# Patient Record
Sex: FEMALE | Race: WHITE | NOT HISPANIC OR LATINO | Employment: OTHER | URBAN - NONMETROPOLITAN AREA
[De-identification: names, ages, dates, MRNs, and addresses within clinical notes are randomized per-mention and may not be internally consistent; named-entity substitution may affect disease eponyms.]

---

## 2021-07-05 ENCOUNTER — APPOINTMENT (EMERGENCY)
Dept: CT IMAGING | Facility: HOSPITAL | Age: 71
End: 2021-07-05
Payer: MEDICARE

## 2021-07-05 ENCOUNTER — HOSPITAL ENCOUNTER (EMERGENCY)
Facility: HOSPITAL | Age: 71
Discharge: HOME | End: 2021-07-05
Attending: EMERGENCY MEDICINE | Admitting: EMERGENCY MEDICINE
Payer: MEDICARE

## 2021-07-05 VITALS
DIASTOLIC BLOOD PRESSURE: 68 MMHG | TEMPERATURE: 97.6 F | HEIGHT: 60 IN | OXYGEN SATURATION: 99 % | RESPIRATION RATE: 17 BRPM | SYSTOLIC BLOOD PRESSURE: 166 MMHG | WEIGHT: 167.55 LBS | BODY MASS INDEX: 32.89 KG/M2 | HEART RATE: 60 BPM

## 2021-07-05 DIAGNOSIS — N39.0 UTI (URINARY TRACT INFECTION): Primary | ICD-10-CM

## 2021-07-05 LAB
ALBUMIN SERPL BCP-MCNC: 3.6 G/DL (ref 3.5–5)
ALP SERPL-CCNC: 93 U/L (ref 46–116)
ALT SERPL W P-5'-P-CCNC: 20 U/L (ref 12–78)
ANION GAP SERPL CALCULATED.3IONS-SCNC: 7 MMOL/L (ref 4–13)
AST SERPL W P-5'-P-CCNC: 19 U/L (ref 5–45)
BACTERIA UR QL AUTO: ABNORMAL /HPF
BASOPHILS # BLD AUTO: 0.06 THOUSANDS/ΜL (ref 0–0.1)
BASOPHILS NFR BLD AUTO: 1 % (ref 0–1)
BILIRUB SERPL-MCNC: 0.35 MG/DL (ref 0.2–1)
BILIRUB UR QL STRIP: ABNORMAL
BUN SERPL-MCNC: 19 MG/DL (ref 5–25)
CALCIUM SERPL-MCNC: 9.5 MG/DL (ref 8.3–10.1)
CHLORIDE SERPL-SCNC: 104 MMOL/L (ref 100–108)
CLARITY UR: CLEAR
CO2 SERPL-SCNC: 30 MMOL/L (ref 21–32)
COLOR UR: YELLOW
CREAT SERPL-MCNC: 1.19 MG/DL (ref 0.6–1.3)
EOSINOPHIL # BLD AUTO: 0.46 THOUSAND/ΜL (ref 0–0.61)
EOSINOPHIL NFR BLD AUTO: 4 % (ref 0–6)
ERYTHROCYTE [DISTWIDTH] IN BLOOD BY AUTOMATED COUNT: 14 % (ref 11.6–15.1)
GFR SERPL CREATININE-BSD FRML MDRD: 46 ML/MIN/1.73SQ M
GLUCOSE SERPL-MCNC: 142 MG/DL (ref 65–140)
GLUCOSE UR STRIP-MCNC: NEGATIVE MG/DL
HCT VFR BLD AUTO: 42.4 % (ref 34.8–46.1)
HGB BLD-MCNC: 14 G/DL (ref 11.5–15.4)
HGB UR QL STRIP.AUTO: NEGATIVE
IMM GRANULOCYTES # BLD AUTO: 0.04 THOUSAND/UL (ref 0–0.2)
IMM GRANULOCYTES NFR BLD AUTO: 0 % (ref 0–2)
KETONES UR STRIP-MCNC: NEGATIVE MG/DL
LACTATE SERPL-SCNC: 1.4 MMOL/L (ref 0.5–2)
LEUKOCYTE ESTERASE UR QL STRIP: ABNORMAL
LIPASE SERPL-CCNC: 145 U/L (ref 73–393)
LYMPHOCYTES # BLD AUTO: 4.02 THOUSANDS/ΜL (ref 0.6–4.47)
LYMPHOCYTES NFR BLD AUTO: 36 % (ref 14–44)
MCH RBC QN AUTO: 33.3 PG (ref 26.8–34.3)
MCHC RBC AUTO-ENTMCNC: 33 G/DL (ref 31.4–37.4)
MCV RBC AUTO: 101 FL (ref 82–98)
MONOCYTES # BLD AUTO: 0.86 THOUSAND/ΜL (ref 0.17–1.22)
MONOCYTES NFR BLD AUTO: 8 % (ref 4–12)
NEUTROPHILS # BLD AUTO: 5.62 THOUSANDS/ΜL (ref 1.85–7.62)
NEUTS SEG NFR BLD AUTO: 51 % (ref 43–75)
NITRITE UR QL STRIP: NEGATIVE
NON-SQ EPI CELLS URNS QL MICRO: ABNORMAL /HPF
NRBC BLD AUTO-RTO: 0 /100 WBCS
PH UR STRIP.AUTO: 7 [PH]
PLATELET # BLD AUTO: 259 THOUSANDS/UL (ref 149–390)
PMV BLD AUTO: 11.7 FL (ref 8.9–12.7)
POTASSIUM SERPL-SCNC: 4.7 MMOL/L (ref 3.5–5.3)
PROT SERPL-MCNC: 8.4 G/DL (ref 6.4–8.2)
PROT UR STRIP-MCNC: ABNORMAL MG/DL
RBC # BLD AUTO: 4.2 MILLION/UL (ref 3.81–5.12)
RBC #/AREA URNS AUTO: ABNORMAL /HPF
SODIUM SERPL-SCNC: 141 MMOL/L (ref 136–145)
SP GR UR STRIP.AUTO: 1.02 (ref 1–1.03)
UROBILINOGEN UR QL STRIP.AUTO: 0.2 E.U./DL
WBC # BLD AUTO: 11.06 THOUSAND/UL (ref 4.31–10.16)
WBC #/AREA URNS AUTO: ABNORMAL /HPF

## 2021-07-05 PROCEDURE — 83605 ASSAY OF LACTIC ACID: CPT | Performed by: EMERGENCY MEDICINE

## 2021-07-05 PROCEDURE — 85025 COMPLETE CBC W/AUTO DIFF WBC: CPT | Performed by: EMERGENCY MEDICINE

## 2021-07-05 PROCEDURE — 83690 ASSAY OF LIPASE: CPT | Performed by: EMERGENCY MEDICINE

## 2021-07-05 PROCEDURE — 99284 EMERGENCY DEPT VISIT MOD MDM: CPT | Performed by: EMERGENCY MEDICINE

## 2021-07-05 PROCEDURE — 87086 URINE CULTURE/COLONY COUNT: CPT | Performed by: EMERGENCY MEDICINE

## 2021-07-05 PROCEDURE — 36415 COLL VENOUS BLD VENIPUNCTURE: CPT | Performed by: EMERGENCY MEDICINE

## 2021-07-05 PROCEDURE — 74176 CT ABD & PELVIS W/O CONTRAST: CPT

## 2021-07-05 PROCEDURE — 80053 COMPREHEN METABOLIC PANEL: CPT | Performed by: EMERGENCY MEDICINE

## 2021-07-05 PROCEDURE — 81001 URINALYSIS AUTO W/SCOPE: CPT | Performed by: EMERGENCY MEDICINE

## 2021-07-05 PROCEDURE — 96360 HYDRATION IV INFUSION INIT: CPT

## 2021-07-05 PROCEDURE — 99284 EMERGENCY DEPT VISIT MOD MDM: CPT

## 2021-07-05 RX ORDER — ATORVASTATIN CALCIUM 80 MG/1
80 TABLET, FILM COATED ORAL
COMMUNITY
Start: 2021-04-08

## 2021-07-05 RX ORDER — NITROFURANTOIN 25; 75 MG/1; MG/1
100 CAPSULE ORAL 2 TIMES DAILY WITH MEALS
Status: DISCONTINUED | OUTPATIENT
Start: 2021-07-05 | End: 2021-07-05 | Stop reason: HOSPADM

## 2021-07-05 RX ORDER — ESCITALOPRAM OXALATE 20 MG/1
20 TABLET ORAL DAILY
COMMUNITY
Start: 2021-03-15

## 2021-07-05 RX ORDER — FINASTERIDE 5 MG/1
5 TABLET, FILM COATED ORAL DAILY
COMMUNITY
Start: 2021-04-19

## 2021-07-05 RX ORDER — LEVOTHYROXINE SODIUM 0.12 MG/1
125 TABLET ORAL DAILY
COMMUNITY
Start: 2021-06-16 | End: 2021-12-13

## 2021-07-05 RX ORDER — INSULIN ASPART 100 [IU]/ML
INJECTION, SOLUTION INTRAVENOUS; SUBCUTANEOUS 3 TIMES DAILY
COMMUNITY
Start: 2021-03-26

## 2021-07-05 RX ORDER — CLOPIDOGREL BISULFATE 75 MG/1
75 TABLET ORAL DAILY
COMMUNITY
Start: 2021-06-16

## 2021-07-05 RX ORDER — GABAPENTIN 100 MG/1
100 CAPSULE ORAL 3 TIMES DAILY
COMMUNITY
Start: 2021-01-08

## 2021-07-05 RX ORDER — CHLORAL HYDRATE 500 MG
2 CAPSULE ORAL 2 TIMES DAILY
COMMUNITY
Start: 2020-10-19 | End: 2021-10-19

## 2021-07-05 RX ORDER — BACLOFEN 20 MG
1000 TABLET ORAL 3 TIMES DAILY
COMMUNITY

## 2021-07-05 RX ORDER — LISINOPRIL 20 MG/1
20 TABLET ORAL DAILY
COMMUNITY
Start: 2021-06-16

## 2021-07-05 RX ORDER — ALBUTEROL SULFATE 90 UG/1
2 AEROSOL, METERED RESPIRATORY (INHALATION) AS NEEDED
COMMUNITY
Start: 2021-04-29 | End: 2022-04-29

## 2021-07-05 RX ORDER — ASPIRIN 81 MG/1
81 TABLET ORAL DAILY
COMMUNITY

## 2021-07-05 RX ORDER — NITROFURANTOIN 25; 75 MG/1; MG/1
100 CAPSULE ORAL 2 TIMES DAILY
Qty: 14 CAPSULE | Refills: 0 | Status: SHIPPED | OUTPATIENT
Start: 2021-07-05 | End: 2021-07-12

## 2021-07-05 RX ADMIN — SODIUM CHLORIDE 1000 ML: 0.9 INJECTION, SOLUTION INTRAVENOUS at 14:57

## 2021-07-05 RX ADMIN — NITROFURANTOIN (MONOHYDRATE/MACROCRYSTALS) 100 MG: 75; 25 CAPSULE ORAL at 16:11

## 2021-07-05 NOTE — ED PROVIDER NOTES
History  Chief Complaint   Patient presents with    Flank Pain     pt c/o bilateral flank pain radiates to abdomen with difficulty urinating for past 2-3 days  Patient complains of bilateral flank pain that radiates into the abdomen  The left appears worse than the right  No fevers or chills  No nausea vomiting diarrhea  States that she is only urinating small amounts  States there is blood when she wipes  No dysuria  No fevers or chills  No recent cough or cold symptoms  Is on Plavix secondary to an aortic stents  No trauma  History provided by:  Patient   used: No    Flank Pain  Pain location:  L flank and R flank  Pain quality: aching    Pain radiation: Radiates to lower abdomen  Pain severity:  Mild  Onset quality:  Gradual  Duration:  2 days  Timing:  Constant  Progression:  Waxing and waning  Chronicity:  New  Context: not alcohol use, not eating, not recent illness, not sick contacts and not suspicious food intake    Relieved by:  Nothing  Worsened by:  Nothing  Ineffective treatments:  None tried  Associated symptoms: hematuria    Associated symptoms: no anorexia, no chest pain, no chills, no constipation, no cough, no diarrhea, no dysuria, no fever, no nausea, no shortness of breath, no sore throat and no vomiting    Risk factors: being elderly        Prior to Admission Medications   Prescriptions Last Dose Informant Patient Reported? Taking?    Cholecalciferol 50 MCG (2000 UT) CAPS   Yes Yes   Sig: Take 2,000 Units by mouth daily   Magnesium Oxide 500 MG TABS   Yes Yes   Sig: Take 1,000 mg by mouth 3 (three) times a day   Omega-3 Fatty Acids (fish oil) 1,000 mg   Yes Yes   Sig: Take 2 g by mouth 2 (two) times a day   albuterol (PROVENTIL HFA,VENTOLIN HFA) 90 mcg/act inhaler   Yes Yes   Sig: Inhale 2 puffs as needed   aspirin (ECOTRIN LOW STRENGTH) 81 mg EC tablet   Yes Yes   Sig: Take 81 mg by mouth daily   atorvastatin (LIPITOR) 80 mg tablet   Yes Yes   Sig: Take 80 mg by mouth daily at bedtime   clopidogrel (PLAVIX) 75 mg tablet   Yes Yes   Sig: Take 75 mg by mouth daily   escitalopram (LEXAPRO) 20 mg tablet   Yes Yes   Sig: Take 20 mg by mouth daily   finasteride (PROSCAR) 5 mg tablet   Yes Yes   Sig: Take 5 mg by mouth daily   gabapentin (NEURONTIN) 100 mg capsule   Yes Yes   Sig: Take 100 mg by mouth 3 (three) times a day   insulin aspart (NovoLOG FlexPen) 100 UNIT/ML injection pen   Yes Yes   Sig: 3 (three) times a day   insulin glargine (LANTUS SOLOSTAR) 100 units/mL injection pen   Yes Yes   Sig: Inject 48 Units under the skin daily at bedtime   levothyroxine 125 mcg tablet   Yes Yes   Sig: Take 125 mcg by mouth daily   lisinopril (ZESTRIL) 20 mg tablet   Yes Yes   Sig: Take 20 mg by mouth daily   metoprolol tartrate (LOPRESSOR) 25 mg tablet   Yes Yes   Sig: Take 25 mg by mouth 2 (two) times a day      Facility-Administered Medications: None       Past Medical History:   Diagnosis Date    [facility]est[facility] failure) ([facility])     Coronary artery disease     Diabetes mellitus ([facility] [address] )     Disease of thyroid gland        Past Surgical History:   Procedure Laterality Date    APPENDECTOMY      CARDIAC SURGERY      CORONARY ANGIOPLASTY WITH STENT PLACEMENT      HERNIA REPAIR         History reviewed  No pertinent family history  I have reviewed and agree with the history as documented  E-Cigarette/Vaping    E-Cigarette Use Never User      E-Cigarette/Vaping Substances     Social History     Tobacco Use    Smoking status: Current Every Day Smoker     Packs/day: 0 25     Types: Cigarettes    Smokeless tobacco: Never Used   Vaping Use    Vaping Use: Never used   Substance Use Topics    Alcohol use: Not Currently    Drug use: Yes     Types: Marijuana       Review of Systems   Constitutional: Negative for chills and fever  HENT: Negative for ear pain, hearing loss, sore throat, trouble swallowing and voice change  Eyes: Negative for pain and discharge  Respiratory: Negative for cough, shortness of breath and wheezing  Cardiovascular: Negative for chest pain and palpitations  Gastrointestinal: Negative for abdominal pain, anorexia, blood in stool, constipation, diarrhea, nausea and vomiting  Genitourinary: Positive for flank pain and hematuria  Negative for dysuria and frequency  Musculoskeletal: Negative for joint swelling, neck pain and neck stiffness  Skin: Negative for rash and wound  Neurological: Negative for dizziness, seizures, syncope, facial asymmetry and headaches  Psychiatric/Behavioral: Negative for hallucinations, self-injury and suicidal ideas  All other systems reviewed and are negative  Physical Exam  Physical Exam  Vitals and nursing note reviewed  Constitutional:       General: She is not in acute distress  Appearance: She is well-developed  HENT:      Head: Normocephalic and atraumatic  Right Ear: External ear normal       Left Ear: External ear normal    Eyes:      General: No scleral icterus  Right eye: No discharge  Left eye: No discharge  Extraocular Movements: Extraocular movements intact  Conjunctiva/sclera: Conjunctivae normal    Cardiovascular:      Rate and Rhythm: Normal rate and regular rhythm  Heart sounds: Normal heart sounds  No murmur heard  Pulmonary:      Effort: Pulmonary effort is normal       Breath sounds: Normal breath sounds  No wheezing or rales  Abdominal:      General: Bowel sounds are normal  There is no distension  Palpations: Abdomen is soft  Tenderness: There is no abdominal tenderness  There is no guarding or rebound  Musculoskeletal:         General: No deformity  Normal range of motion  Cervical back: Normal range of motion and neck supple  Skin:     General: Skin is warm and dry  Findings: No rash  Neurological:      General: No focal deficit present        Mental Status: She is alert and oriented to person, place, and time  Cranial Nerves: No cranial nerve deficit  Psychiatric:         Mood and Affect: Mood normal          Behavior: Behavior normal          Thought Content: Thought content normal          Judgment: Judgment normal          Vital Signs  ED Triage Vitals [07/05/21 1434]   Temperature Pulse Respirations Blood Pressure SpO2   97 6 °F (36 4 °C) 67 17 (!) 177/75 96 %      Temp Source Heart Rate Source Patient Position - Orthostatic VS BP Location FiO2 (%)   Temporal Monitor Sitting Left arm --      Pain Score       7           Vitals:    07/05/21 1500 07/05/21 1530 07/05/21 1600 07/05/21 1612   BP: 135/63 148/62 166/68 166/68   Pulse: 58 55 60 60   Patient Position - Orthostatic VS: Lying Lying Lying Sitting         Visual Acuity      ED Medications  Medications   nitrofurantoin (MACROBID) extended-release capsule 100 mg (100 mg Oral Given 7/5/21 1611)   sodium chloride 0 9 % bolus 1,000 mL (0 mL Intravenous Stopped 7/5/21 1613)       Diagnostic Studies  Results Reviewed     Procedure Component Value Units Date/Time    Urine Microscopic [[id number]]  (Abnormal) Collected: 07/05/21 1555    Lab Status: Final result Specimen: Urine, Clean Catch Updated: 07/05/21 1619     RBC, UA None Seen /hpf      WBC, UA 10-20 /hpf      Epithelial Cells Occasional /hpf      Bacteria, UA Occasional /hpf     Urine culture [[id number]] Collected: 07/05/21 1555    Lab Status:  In process Specimen: Urine, Clean Catch Updated: 07/05/21 1619    UA w Reflex to Microscopic w Reflex to Culture [[id number]]  (Abnormal) Collected: 07/05/21 1555    Lab Status: Final result Specimen: Urine, Clean Catch Updated: 07/05/21 1604     Color, UA Yellow     Clarity, UA Clear     Specific Gravity, UA 1 025     pH, UA 7 0     Leukocytes, UA Trace     Nitrite, UA Negative     Protein,  (2+) mg/dl      Glucose, UA Negative mg/dl      Ketones, UA Negative mg/dl      Urobilinogen, UA 0 2 E U /dl      Bilirubin, UA Small     Blood, UA Negative Lactic acid [[id number]]  (Normal) Collected: 07/05/21 1455    Lab Status: Final result Specimen: Blood from Line, Venous Updated: 07/05/21 1523     LACTIC ACID 1 4 mmol/L     Narrative:      Result may be elevated if tourniquet was used during collection      Comprehensive metabolic panel [[id number]]  (Abnormal) Collected: 07/05/21 1455    Lab Status: Final result Specimen: Blood from Line, Venous Updated: 07/05/21 1515     Sodium 141 mmol/L      Potassium 4 7 mmol/L      Chloride 104 mmol/L      CO2 30 mmol/L      ANION GAP 7 mmol/L      BUN 19 mg/dL      Creatinine 1 19 mg/dL      Glucose 142 mg/dL      Calcium 9 5 mg/dL      AST 19 U/L      ALT 20 U/L      Alkaline Phosphatase 93 U/L      Total Protein 8 4 g/dL      Albumin 3 6 g/dL      Total Bilirubin 0 35 mg/dL      eGFR 46 ml/min/1 73sq m     Narrative:      [place] guidelines for Chronic Kidney Disease (CKD):     Stage 1 with normal or high GFR (GFR > 90 mL/min/1 73 square meters)    Stage 2 Mild CKD (GFR = 60-89 mL/min/1 73 square meters)    Stage 3A Moderate CKD (GFR = 45-59 mL/min/1 73 square meters)    Stage 3B Moderate CKD (GFR = 30-44 mL/min/1 73 square meters)    Stage 4 Severe CKD (GFR = 15-29 mL/min/1 73 square meters)    Stage 5 End Stage CKD (GFR <15 mL/min/1 73 square meters)  Note: GFR calculation is accurate only with a steady state creatinine    Lipase [[id number]]  (Normal) Collected: 07/05/21 1455    Lab Status: Final result Specimen: Blood from Line, Venous Updated: 07/05/21 1515     Lipase 145 u/L     CBC and differential [[id number]]  (Abnormal) Collected: 07/05/21 1455    Lab Status: Final result Specimen: Blood from Line, Venous Updated: 07/05/21 1502     WBC 11 06 Thousand/uL      RBC 4 20 Million/uL      Hemoglobin 14 0 g/dL      Hematocrit 42 4 %       fL      MCH 33 3 pg      MCHC 33 0 g/dL      RDW 14 0 %      MPV 11 7 fL      Platelets 655 Thousands/uL      nRBC 0 /100 WBCs      Neutrophils Relative 51 %      Immat GRANS % 0 %      Lymphocytes Relative 36 %      Monocytes Relative 8 %      Eosinophils Relative 4 %      Basophils Relative 1 %      Neutrophils Absolute 5 62 Thousands/µL      Immature Grans Absolute 0 04 Thousand/uL      Lymphocytes Absolute 4 02 Thousands/µL      Monocytes Absolute 0 86 Thousand/µL      Eosinophils Absolute 0 46 Thousand/µL      Basophils Absolute 0 06 Thousands/µL                  CT abdomen pelvis wo contrast   Final Result by Cr Stratton MD (07/05 1601)      No evidence of stone or hydronephrosis in the kidneys  No definite ureteral calculus  Of note there are a few small phleboliths near the course of the distal ureters without obvious ureteral stone  Left colon diverticulosis without CT evidence of diverticulitis  Workstation performed: [id number]                    Procedures  Procedures         ED Course  ED Course as of Jul 05 1624   Mon Jul 05, 2021   1600 Bladder scan was only 21 cc of urine  SBIRT 20yo+      Most Recent Value   SBIRT (22 yo +)   In order to provide better care to our patients, we are screening all of our patients for alcohol and drug use  Would it be okay to ask you these screening questions? Yes Filed at: 07/05/2021 1445   Initial Alcohol Screen: US AUDIT-C    1  How often do you have a drink containing alcohol?  0 Filed at: 07/05/2021 1445   2  How many drinks containing alcohol do you have on a typical day you are drinking? 0 Filed at: 07/05/2021 1445   3b  FEMALE Any Age, or MALE 65+: How often do you have 4 or more drinks on one occassion? 0 Filed at: 07/05/2021 1445   Audit-C Score  0 Filed at: 07/05/2021 1445   CL[place]: How many times in the past year have you    Used an illegal drug or used a prescription medication for non-medical reasons?   Never Filed at: 07/05/2021 1445                    MDM  Number of Diagnoses or Management Options  Diagnosis management comments: Patient has normal-appearing kidneys with no hydronephrosis or hydroureteronephrosis on CT scan  Creatinine is normal per blood work  Most likely UTI  Will treat with antibiotics  Amount and/or Complexity of Data Reviewed  Clinical lab tests: reviewed  Review and summarize past medical records: yes        Disposition  Final diagnoses:   UTI (urinary tract infection)     Time reflects when diagnosis was documented in both MDM as applicable and the Disposition within this note     Time User Action Codes Description Comment    7/5/2021  4:07 PM Carine Gonzalez Add [N39 0] UTI (urinary tract infection)       ED Disposition     ED Disposition Condition Date/Time Comment    Discharge Stable Mon Jul 5, 2021  4:07 PM Apple Espinoza discharge to home/self care  Follow-up Information     Follow up With Specialties Details Why Contact Info    Prabhjot Botello DO Family Medicine Call in 2 days  [address] [facility]  [phone]            Patient's Medications   Discharge Prescriptions    NITROFURANTOIN (MACROBID) 100 MG CAPSULE    Take 1 capsule (100 mg total) by mouth 2 (two) times a day for 7 days       Start Date: 7/5/2021  End Date: 7/12/2021       Order Dose: 100 mg       Quantity: 14 capsule    Refills: 0     No discharge procedures on file      PDMP Review     None          ED Provider  Electronically Signed by           Calvin Izquierdo MD  07/05/21 4972

## 2021-07-07 LAB — BACTERIA UR CULT: NORMAL

## 2023-12-31 ENCOUNTER — APPOINTMENT (OUTPATIENT)
Dept: RADIOLOGY | Facility: CLINIC | Age: 73
End: 2023-12-31
Payer: MEDICARE

## 2023-12-31 ENCOUNTER — OFFICE VISIT (OUTPATIENT)
Dept: URGENT CARE | Facility: CLINIC | Age: 73
End: 2023-12-31
Payer: MEDICARE

## 2023-12-31 VITALS
DIASTOLIC BLOOD PRESSURE: 102 MMHG | SYSTOLIC BLOOD PRESSURE: 136 MMHG | RESPIRATION RATE: 16 BRPM | HEIGHT: 61 IN | HEART RATE: 74 BPM | BODY MASS INDEX: 30.21 KG/M2 | WEIGHT: 160 LBS | OXYGEN SATURATION: 95 % | TEMPERATURE: 96.1 F

## 2023-12-31 DIAGNOSIS — M25.552 PAIN OF LEFT HIP: ICD-10-CM

## 2023-12-31 DIAGNOSIS — M25.552 PAIN OF LEFT HIP: Primary | ICD-10-CM

## 2023-12-31 PROCEDURE — G0463 HOSPITAL OUTPT CLINIC VISIT: HCPCS

## 2023-12-31 PROCEDURE — 73502 X-RAY EXAM HIP UNI 2-3 VIEWS: CPT

## 2023-12-31 PROCEDURE — 99213 OFFICE O/P EST LOW 20 MIN: CPT

## 2023-12-31 RX ORDER — FENOFIBRATE 48 MG/1
TABLET, COATED ORAL
COMMUNITY

## 2023-12-31 RX ORDER — TRAMADOL HYDROCHLORIDE 50 MG/1
50 TABLET ORAL EVERY 6 HOURS PRN
Qty: 20 TABLET | Refills: 0 | Status: SHIPPED | OUTPATIENT
Start: 2023-12-31

## 2023-12-31 RX ORDER — OMEPRAZOLE 20 MG/1
20 TABLET, DELAYED RELEASE ORAL DAILY
COMMUNITY

## 2023-12-31 RX ORDER — MAGNESIUM 30 MG
71.5 TABLET ORAL 2 TIMES DAILY
COMMUNITY

## 2023-12-31 NOTE — PROGRESS NOTES
[facility]        NAME: Mirna Medina is a 73 y.o. female  : 1950    MRN: [id number]  DATE: 2023  TIME: 2:16 PM    Assessment and Plan   Pain of left hip [M25.552]  1. Pain of left hip  XR hip/pelv 2-3 vws left if performed    traMADol (Ultram) 50 mg tablet    Ambulatory Referral to Orthopedic Surgery        X-ray interpreted by myself. Arthritis changes. Pending radiology review     Patient Instructions     Take the tramadol as needed for pain  Do not drive or drink alcohol while taking .  You can use an over the counter lidoderm patch in addition to the tramadol if needed  Follow-up with the orthopedic  Follow up with PCP in 3-5 days.  Proceed to  ER if symptoms worsen.    Chief Complaint     Chief Complaint   Patient presents with    Hip Pain     X7 days - mechanical fall onto L hip. Fell from standing onto carpeted floor. Denies head strike and LOC. Unable to sleep due to pain. Takes plavix. OTC - tylenol, topical creams         History of Present Illness       Patient is a 73YOF presenting with left hip pain after a fall a week ago. She states she lost her balance and fell onto her carpet. She denies any head injury. She is taking plavix. She has been using heat and taking tylenol with out any relief. She states she does have  arthritis in her joints.     Hip Pain         Review of Systems   Review of Systems   Musculoskeletal:  Positive for arthralgias. Negative for back pain.   Neurological:  Negative for syncope, weakness and headaches.   All other systems reviewed and are negative.        Current Medications       Current Outpatient Medications:     aspirin (ECOTRIN LOW STRENGTH) 81 mg EC tablet, Take 81 mg by mouth daily, Disp: , Rfl:     atorvastatin (LIPITOR) 80 mg tablet, Take 80 mg by mouth daily at bedtime, Disp: , Rfl:     Cholecalciferol 50 MCG (2000) CAPS, Take 2,000 Units by mouth daily, Disp: , Rfl:     clopidogrel (PLAVIX) 75 mg tablet, Take 75 mg by mouth  daily, Disp: , Rfl:     Empagliflozin (JARDIANCE) 10 MG TABS tablet, Take 10 mg by mouth daily, Disp: , Rfl:     finasteride (PROSCAR) 5 mg tablet, Take 5 mg by mouth daily, Disp: , Rfl:     gabapentin (NEURONTIN) 100 mg capsule, Take 100 mg by mouth 3 (three) times a day, Disp: , Rfl:     insulin aspart (NovoLOG FlexPen) 100 UNIT/ML injection pen, 3 (three) times a day, Disp: , Rfl:     insulin glargine (LANTUS SOLOSTAR) 100 units/mL injection pen, Inject 48 Units under the skin daily at bedtime, Disp: , Rfl:     levothyroxine 125 mcg tablet, Take 150 mcg by mouth daily, Disp: , Rfl:     magnesium 30 MG tablet, Take 71.5 mg by mouth 2 (two) times a day 71.5mg OTC - magnesium chloride (slow Mag), Disp: , Rfl:     metoprolol tartrate (LOPRESSOR) 25 mg tablet, Take 25 mg by mouth 2 (two) times a day, Disp: , Rfl:     omeprazole (PriLOSEC OTC) 20 MG tablet, Take 20 mg by mouth daily, Disp: , Rfl:     traMADol (Ultram) 50 mg tablet, Take 1 tablet (50 mg total) by mouth every 6 (six) hours as needed for moderate pain, Disp: 20 tablet, Rfl: 0    escitalopram (LEXAPRO) 20 mg tablet, Take 20 mg by mouth daily, Disp: , Rfl:     fenofibrate (TRICOR) 48 mg tablet, , Disp: , Rfl:     lisinopril (ZESTRIL) 20 mg tablet, Take 20 mg by mouth daily, Disp: , Rfl:     Magnesium Oxide 500 MG TABS, Take 1,000 mg by mouth 3 (three) times a day, Disp: , Rfl:     Omega-3 Fatty Acids (fish oil) 1,000 mg, Take 2 g by mouth 2 (two) times a day, Disp: , Rfl:     Current Allergies     Allergies as of 12/31/2023 - Reviewed 12/31/2023   Allergen Reaction Noted    Bupropion GI Intolerance 01/20/2020    Varenicline Other (See Comments) 05/04/2023    Ibuprofen Other (See Comments) 09/12/2019    Liraglutide GI Intolerance 10/19/2020    Medical tape Other (See Comments) 04/03/2013    Wound dressing adhesive Itching 07/05/2021    Keflex [cephalexin] Rash 07/05/2021    Latex Rash 03/04/2015            The following portions of the patient's history were  "reviewed and updated as appropriate: allergies, current medications, past family history, past medical history, past social history, past surgical history and problem list.     Past Medical History:   Diagnosis Date    CHF (congestive heart failure) (HCC)     Coronary artery disease     Diabetes mellitus (HCC)     Disease of thyroid gland        Past Surgical History:   Procedure Laterality Date    APPENDECTOMY      CARDIAC SURGERY      CORONARY ANGIOPLASTY WITH STENT PLACEMENT      HERNIA REPAIR         History reviewed. No pertinent family history.      Medications have been verified.        Objective   BP (!) 136/102 (BP Location: Right arm, Patient Position: Sitting, Cuff Size: Standard)   Pulse 74   Temp (!) 96.1 °F (35.6 °C) (Temporal)   Resp 16   Ht 5' 0.5\" (1.537 m)   Wt 72.6 kg (160 lb)   SpO2 95%   BMI 30.73 kg/m²        Physical Exam     Physical Exam  Vitals and nursing note reviewed.   Constitutional:       General: She is not in acute distress.     Appearance: Normal appearance. She is normal weight. She is not ill-appearing or toxic-appearing.   Musculoskeletal:      Left hip: Tenderness present. No deformity. Decreased range of motion. Normal strength.        Legs:    Neurological:      Mental Status: She is alert.                   "

## 2023-12-31 NOTE — PATIENT INSTRUCTIONS
Take the tramadol as needed for pain  Do not drive or drink alcohol while taking .  You can use an over the counter lidoderm patch in addition to the tramadol if needed  Follow-up with the orthopedic

## 2024-01-01 ENCOUNTER — HOSPITAL ENCOUNTER (EMERGENCY)
Facility: HOSPITAL | Age: 74
Discharge: HOME | End: 2024-01-01
Attending: EMERGENCY MEDICINE
Payer: MEDICARE

## 2024-01-01 VITALS
DIASTOLIC BLOOD PRESSURE: 94 MMHG | TEMPERATURE: 97.5 F | SYSTOLIC BLOOD PRESSURE: 141 MMHG | HEART RATE: 76 BPM | RESPIRATION RATE: 18 BRPM | OXYGEN SATURATION: 94 %

## 2024-01-01 DIAGNOSIS — M25.552 LEFT HIP PAIN: Primary | ICD-10-CM

## 2024-01-01 PROCEDURE — 99284 EMERGENCY DEPT VISIT MOD MDM: CPT | Performed by: EMERGENCY MEDICINE

## 2024-01-01 PROCEDURE — 96372 THER/PROPH/DIAG INJ SC/IM: CPT

## 2024-01-01 PROCEDURE — 99283 EMERGENCY DEPT VISIT LOW MDM: CPT

## 2024-01-01 RX ORDER — OXYCODONE HYDROCHLORIDE AND ACETAMINOPHEN 5; 325 MG/1; MG/1
1 TABLET ORAL ONCE
Status: COMPLETED | OUTPATIENT
Start: 2024-01-01 | End: 2024-01-01

## 2024-01-01 RX ORDER — KETOROLAC TROMETHAMINE 30 MG/ML
30 INJECTION, SOLUTION INTRAMUSCULAR; INTRAVENOUS ONCE
Status: COMPLETED | OUTPATIENT
Start: 2024-01-01 | End: 2024-01-01

## 2024-01-01 RX ORDER — LIDOCAINE 50 MG/G
1 PATCH TOPICAL DAILY
Qty: 15 PATCH | Refills: 0 | Status: SHIPPED | OUTPATIENT
Start: 2024-01-01 | End: 2024-01-16

## 2024-01-01 RX ORDER — OXYCODONE HYDROCHLORIDE AND ACETAMINOPHEN 5; 325 MG/1; MG/1
1 TABLET ORAL EVERY 4 HOURS PRN
Qty: 10 TABLET | Refills: 0 | Status: SHIPPED | OUTPATIENT
Start: 2024-01-01

## 2024-01-01 RX ORDER — LIDOCAINE 50 MG/G
1 PATCH TOPICAL ONCE
Status: DISCONTINUED | OUTPATIENT
Start: 2024-01-01 | End: 2024-01-01 | Stop reason: HOSPADM

## 2024-01-01 RX ADMIN — KETOROLAC TROMETHAMINE 30 MG: 30 INJECTION, SOLUTION INTRAMUSCULAR at 12:45

## 2024-01-01 RX ADMIN — OXYCODONE HYDROCHLORIDE AND ACETAMINOPHEN 1 TABLET: 5; 325 TABLET ORAL at 12:44

## 2024-01-01 RX ADMIN — LIDOCAINE 5% 1 PATCH: 700 PATCH TOPICAL at 12:44

## 2024-01-01 NOTE — ED PROVIDER NOTES
History  Chief Complaint   Patient presents with    Hip Pain     Patient c/o left hip pain that is chronic. Patient was told she needed to have sx but wasn't able to follow up. Patient seen at urgent care yesterday.      Patient is a 73-year-old female presenting to the emergency department complaining of left hip pain that is been bothering her for the past week, she did have a fall onto her knee at onset of symptoms, she was seen at urgent care yesterday and had x-rays performed, she was diagnosed with arthritis of the hip and prescribed tramadol, she reports this medication is not alleviating her symptoms at all, she was also referred for follow-up with orthopedics but obviously has not had time to do so since her visit yesterday and the holiday today, she denies any numbness or tingling, no bowel or bladder dysfunction, no fever or chills, no urinary symptoms        Prior to Admission Medications   Prescriptions Last Dose Informant Patient Reported? Taking?   Cholecalciferol 50 MCG (2000 UT) CAPS   Yes No   Sig: Take 2,000 Units by mouth daily   Empagliflozin (JARDIANCE) 10 MG TABS tablet   Yes No   Sig: Take 10 mg by mouth daily   Magnesium Oxide 500 MG TABS   Yes No   Sig: Take 1,000 mg by mouth 3 (three) times a day   Omega-3 Fatty Acids (fish oil) 1,000 mg   Yes No   Sig: Take 2 g by mouth 2 (two) times a day   aspirin (ECOTRIN LOW STRENGTH) 81 mg EC tablet   Yes No   Sig: Take 81 mg by mouth daily   atorvastatin (LIPITOR) 80 mg tablet   Yes No   Sig: Take 80 mg by mouth daily at bedtime   clopidogrel (PLAVIX) 75 mg tablet   Yes No   Sig: Take 75 mg by mouth daily   escitalopram (LEXAPRO) 20 mg tablet   Yes No   Sig: Take 20 mg by mouth daily   fenofibrate (TRICOR) 48 mg tablet   Yes No   finasteride (PROSCAR) 5 mg tablet   Yes No   Sig: Take 5 mg by mouth daily   gabapentin (NEURONTIN) 100 mg capsule   Yes No   Sig: Take 100 mg by mouth 3 (three) times a day   insulin aspart (NovoLOG FlexPen) 100 UNIT/ML  injection pen   Yes No   Sig: 3 (three) times a day   insulin glargine (LANTUS SOLOSTAR) 100 units/mL injection pen   Yes No   Sig: Inject 48 Units under the skin daily at bedtime   levothyroxine 125 mcg tablet  Self Yes No   Sig: Take 150 mcg by mouth daily   lisinopril (ZESTRIL) 20 mg tablet   Yes No   Sig: Take 20 mg by mouth daily   magnesium 30 MG tablet  Self Yes No   Sig: Take 71.5 mg by mouth 2 (two) times a day 71.5mg OTC - magnesium chloride (slow Mag)   metoprolol tartrate (LOPRESSOR) 25 mg tablet   Yes No   Sig: Take 25 mg by mouth 2 (two) times a day   omeprazole (PriLOSEC OTC) 20 MG tablet   Yes No   Sig: Take 20 mg by mouth daily   traMADol (Ultram) 50 mg tablet   No No   Sig: Take 1 tablet (50 mg total) by mouth every 6 (six) hours as needed for moderate pain      Facility-Administered Medications: None       Past Medical History:   Diagnosis Date    CHF (congestive heart failure) (HCC)     Coronary artery disease     Diabetes mellitus (HCC)     Disease of thyroid gland        Past Surgical History:   Procedure Laterality Date    APPENDECTOMY      CARDIAC SURGERY      CORONARY ANGIOPLASTY WITH STENT PLACEMENT      HERNIA REPAIR         History reviewed. No pertinent family history.  I have reviewed and agree with the history as documented.    E-Cigarette/Vaping    E-Cigarette Use Never User      E-Cigarette/Vaping Substances     Social History     Tobacco Use    Smoking status: Every Day     Current packs/day: 0.25     Types: Cigarettes    Smokeless tobacco: Never   Vaping Use    Vaping status: Never Used   Substance Use Topics    Alcohol use: Not Currently    Drug use: Yes     Types: Marijuana       Review of Systems   Constitutional: Negative.    HENT: Negative.     Eyes: Negative.    Respiratory: Negative.     Cardiovascular: Negative.    Gastrointestinal: Negative.    Endocrine: Negative.    Genitourinary: Negative.    Musculoskeletal:  Positive for arthralgias.   Skin: Negative.     Allergic/Immunologic: Negative.    Neurological: Negative.    Hematological: Negative.    Psychiatric/Behavioral: Negative.         Physical Exam  Physical Exam  Constitutional:       Appearance: She is well-developed.   HENT:      Head: Normocephalic and atraumatic.   Eyes:      Conjunctiva/sclera: Conjunctivae normal.      Pupils: Pupils are equal, round, and reactive to light.   Cardiovascular:      Rate and Rhythm: Normal rate.   Pulmonary:      Effort: Pulmonary effort is normal.   Abdominal:      Palpations: Abdomen is soft.   Musculoskeletal:         General: Normal range of motion.      Cervical back: Normal range of motion and neck supple.        Legs:       Comments: Tenderness to palpate over the left lateral hip, no ecchymosis, skin lesions, erythema, swelling, no bony deformity, distal sensation and motor is intact, gait steady   Skin:     General: Skin is warm and dry.   Neurological:      Mental Status: She is alert and oriented to person, place, and time.         Vital Signs  ED Triage Vitals   Temperature Pulse Respirations Blood Pressure SpO2   01/01/24 1155 01/01/24 1155 01/01/24 1155 01/01/24 1155 01/01/24 1155   97.5 °F (36.4 °C) 76 18 141/94 94 %      Temp Source Heart Rate Source Patient Position - Orthostatic VS BP Location FiO2 (%)   01/01/24 1155 01/01/24 1155 01/01/24 1155 01/01/24 1155 --   Temporal Monitor Sitting Left arm       Pain Score       01/01/24 1244       7           Vitals:    01/01/24 1155   BP: 141/94   Pulse: 76   Patient Position - Orthostatic VS: Sitting         Visual Acuity      ED Medications  Medications   lidocaine (LIDODERM) 5 % patch 1 patch (1 patch Topical Medication Applied 1/1/24 1244)   ketorolac (TORADOL) injection 30 mg (30 mg Intramuscular Given 1/1/24 1245)   oxyCODONE-acetaminophen (PERCOCET) 5-325 mg per tablet 1 tablet (1 tablet Oral Given 1/1/24 1244)       Diagnostic Studies  Results Reviewed       None                   No orders to display               Procedures  Procedures         ED Course                                             Medical Decision Making   Patient presents with left hip pain.  Given history, exam and work-up, patient likely has osteoarthritis versus muscle strain versus bursitis.  I have low suspicion for fracture, dislocation, significant ligamentous injury, septic arthritis, gout flare, new autoimmune arthropathy or gonococcal arthropathy.      Problems Addressed:  Left hip pain: acute illness or injury    Amount and/or Complexity of Data Reviewed  External Data Reviewed: labs, radiology and notes.    Risk  OTC drugs.  Prescription drug management.             Disposition  Final diagnoses:   Left hip pain     Time reflects when diagnosis was documented in both MDM as applicable and the Disposition within this note       Time User Action Codes Description Comment    1/1/2024 12:37 PM Malinda Maya [M25.552] Left hip pain           ED Disposition       ED Disposition   Discharge    Condition   Stable    Date/Time   Mon Jan 1, 2024 12:37 PM    Comment   Mirna Medina discharge to home/self care.                   Follow-up Information       Follow up With Specialties Details Why Contact Info    [facility] In 1 week  [address] [facility]  [address] [place] [id number]  [phone]              Patient's Medications   Discharge Prescriptions    LIDOCAINE (LIDODERM) 5 %    Apply 1 patch topically over 12 hours daily for 15 days Remove & Discard patch within 12 hours or as directed by MD       Start Date: 1/1/2024  End Date: 1/16/2024       Order Dose: 1 patch       Quantity: 15 patch    Refills: 0    OXYCODONE-ACETAMINOPHEN (PERCOCET) 5-325 MG PER TABLET    Take 1 tablet by mouth every 4 (four) hours as needed for moderate pain for up to 10 doses Max Daily Amount: 6 tablets       Start Date: 1/1/2024  End Date: --       Order Dose: 1 tablet       Quantity: 10 tablet    Refills: 0           PDMP Review        None            ED Provider  Electronically Signed by             Malinda Maya DO  01/01/24 1243

## 2024-01-03 ENCOUNTER — OFFICE VISIT (OUTPATIENT)
Dept: OBGYN CLINIC | Facility: CLINIC | Age: 74
End: 2024-01-03
Payer: MEDICARE

## 2024-01-03 ENCOUNTER — HOSPITAL ENCOUNTER (OUTPATIENT)
Dept: RADIOLOGY | Facility: CLINIC | Age: 74
Discharge: HOME | End: 2024-01-03
Payer: MEDICARE

## 2024-01-03 VITALS
HEIGHT: 60 IN | HEART RATE: 77 BPM | BODY MASS INDEX: 32.39 KG/M2 | TEMPERATURE: 97.8 F | WEIGHT: 165 LBS | DIASTOLIC BLOOD PRESSURE: 70 MMHG | SYSTOLIC BLOOD PRESSURE: 120 MMHG

## 2024-01-03 DIAGNOSIS — R29.818 NEUROGENIC CLAUDICATION: Primary | ICD-10-CM

## 2024-01-03 DIAGNOSIS — M70.62 GREATER TROCHANTERIC BURSITIS OF LEFT HIP: ICD-10-CM

## 2024-01-03 DIAGNOSIS — M25.552 LEFT HIP PAIN: ICD-10-CM

## 2024-01-03 DIAGNOSIS — R29.818 NEUROGENIC CLAUDICATION: ICD-10-CM

## 2024-01-03 PROCEDURE — 20610 DRAIN/INJ JOINT/BURSA W/O US: CPT | Performed by: ORTHOPAEDIC SURGERY

## 2024-01-03 PROCEDURE — 72100 X-RAY EXAM L-S SPINE 2/3 VWS: CPT

## 2024-01-03 PROCEDURE — 99214 OFFICE O/P EST MOD 30 MIN: CPT | Performed by: ORTHOPAEDIC SURGERY

## 2024-01-03 RX ADMIN — TRIAMCINOLONE ACETONIDE 80 MG: 40 INJECTION, SUSPENSION INTRA-ARTICULAR; INTRAMUSCULAR at 14:30

## 2024-01-03 RX ADMIN — BUPIVACAINE HYDROCHLORIDE 4 ML: 2.5 INJECTION, SOLUTION INFILTRATION; PERINEURAL at 14:30

## 2024-01-03 NOTE — PROGRESS NOTES
ASSESSMENT/PLAN:    Diagnoses and all orders for this visit:    Neurogenic claudication  -     XR spine lumbar 2 or 3 views injury; Future  -     Ambulatory Referral to Physical Therapy; Future  -     Ambulatory referral to Spine & Pain Management; Future    Greater trochanteric bursitis of left hip  -     Ambulatory Referral to Orthopedic Surgery  -     Ambulatory Referral to Physical Therapy; Future  -     Large joint arthrocentesis: L greater trochanteric bursa    Left hip pain  -     Ambulatory Referral to Orthopedic Surgery  -     Ambulatory Referral to Physical Therapy; Future        Reviewed physical exam and imaging with patient at time of visit. Discussed with patient that the radicular symptoms she experiences are consistent with neurogenic claudication. She also demonstrates symptoms consistent with greater trochanteric bursitis of her left hip. She was offered and accepted an injection(s) of Kenalog and Marcaine to her Left greater trochanter(s) for symptomatic relief of pain and inflammation. Patient tolerated the treatment(s) well. Ice and post injection protocol advised. Weightbearing activities as tolerated. A prescription was provided for physical therapy. Recommended that she follow-up with pain management for further evaluation. The patient expresses understanding and is in agreement with today's treatment plan.       Return if symptoms worsen or fail to improve.      _____________________________________________________  CHIEF COMPLAINT:  Chief Complaint   Patient presents with    Left Hip - Pain         SUBJECTIVE:  Mirna Medina is a 73 y.o. year old female who presents for initial evaluation of her left hip. The patient reports intermittent pain for approximately 10 years, however, she reports a recent onset of significant pain for approximately 1 week. She reported to her local urgent care on 12/31/23 where she was prescribed tramadol. She states that the pain continued to worsen, therefore  she reported to her local ED on 1/1/24 where she was provided a prescription for oxycodone. She states that she has only taken one to two doses of the oxycodone, which provided minimal relief. She reports significant pain along the lateral aspect of her hip, that travels down her thigh to her knee. She reports chronic pain that is present at rest and exacerbated with activity. She also reports pain in her right thigh. She reports pain in her low back. The patient reports weakness and fatigue with activity.        PAST MEDICAL HISTORY:  Past Medical History:   Diagnosis Date    CHF (congestive heart failure) (HCC)     Coronary artery disease     Diabetes mellitus (HCC)     Disease of thyroid gland        PAST SURGICAL HISTORY:  Past Surgical History:   Procedure Laterality Date    APPENDECTOMY      CARDIAC SURGERY      CORONARY ANGIOPLASTY WITH STENT PLACEMENT      HERNIA REPAIR         FAMILY HISTORY:  History reviewed. No pertinent family history.    SOCIAL HISTORY:  Social History     Tobacco Use    Smoking status: Every Day     Current packs/day: 0.25     Types: Cigarettes    Smokeless tobacco: Never   Vaping Use    Vaping status: Never Used   Substance Use Topics    Alcohol use: Not Currently    Drug use: Yes     Types: Marijuana       MEDICATIONS:    Current Outpatient Medications:     aspirin (ECOTRIN LOW STRENGTH) 81 mg EC tablet, Take 81 mg by mouth daily, Disp: , Rfl:     atorvastatin (LIPITOR) 80 mg tablet, Take 80 mg by mouth daily at bedtime, Disp: , Rfl:     Cholecalciferol 50 MCG (2000 UT) CAPS, Take 2,000 Units by mouth daily, Disp: , Rfl:     clopidogrel (PLAVIX) 75 mg tablet, Take 75 mg by mouth daily, Disp: , Rfl:     Empagliflozin (JARDIANCE) 10 MG TABS tablet, Take 10 mg by mouth daily, Disp: , Rfl:     escitalopram (LEXAPRO) 20 mg tablet, Take 20 mg by mouth daily, Disp: , Rfl:     fenofibrate (TRICOR) 48 mg tablet, , Disp: , Rfl:     finasteride (PROSCAR) 5 mg tablet, Take 5 mg by mouth daily,  Disp: , Rfl:     gabapentin (NEURONTIN) 100 mg capsule, Take 100 mg by mouth 3 (three) times a day, Disp: , Rfl:     insulin aspart (NovoLOG FlexPen) 100 UNIT/ML injection pen, 3 (three) times a day, Disp: , Rfl:     insulin glargine (LANTUS SOLOSTAR) 100 units/mL injection pen, Inject 48 Units under the skin daily at bedtime, Disp: , Rfl:     levothyroxine 125 mcg tablet, Take 150 mcg by mouth daily, Disp: , Rfl:     lidocaine (Lidoderm) 5 %, Apply 1 patch topically over 12 hours daily for 15 days Remove & Discard patch within 12 hours or as directed by MD, Disp: 15 patch, Rfl: 0    lisinopril (ZESTRIL) 20 mg tablet, Take 20 mg by mouth daily, Disp: , Rfl:     magnesium 30 MG tablet, Take 71.5 mg by mouth 2 (two) times a day 71.5mg OTC - magnesium chloride (slow Mag), Disp: , Rfl:     Magnesium Oxide 500 MG TABS, Take 1,000 mg by mouth 3 (three) times a day, Disp: , Rfl:     metoprolol tartrate (LOPRESSOR) 25 mg tablet, Take 25 mg by mouth 2 (two) times a day, Disp: , Rfl:     Omega-3 Fatty Acids (fish oil) 1,000 mg, Take 2 g by mouth 2 (two) times a day, Disp: , Rfl:     omeprazole (PriLOSEC OTC) 20 MG tablet, Take 20 mg by mouth daily, Disp: , Rfl:     oxyCODONE-acetaminophen (Percocet) 5-325 mg per tablet, Take 1 tablet by mouth every 4 (four) hours as needed for moderate pain for up to 10 doses Max Daily Amount: 6 tablets, Disp: 10 tablet, Rfl: 0    traMADol (Ultram) 50 mg tablet, Take 1 tablet (50 mg total) by mouth every 6 (six) hours as needed for moderate pain (Patient not taking: Reported on 1/3/2024), Disp: 20 tablet, Rfl: 0    ALLERGIES:  Allergies   Allergen Reactions    Bupropion GI Intolerance    Varenicline Other (See Comments)     No appetite, nauseated, no energy    Ibuprofen Other (See Comments)     Does not combine with medicine she takes    Liraglutide GI Intolerance    Medical Tape Other (See Comments)     itchy and red    Wound Dressing Adhesive Itching    Keflex [Cephalexin] Rash    Latex  Rash       Review of systems:   Constitutional: Negative for fatigue, fever or loss of apetite.   HENT: Negative.    Respiratory: Negative for shortness of breath, dyspnea.    Cardiovascular: Negative for chest pain/tightness.   Gastrointestinal: Negative for abdominal pain, N/V.   Endocrine: Negative for cold/heat intolerance, unexplained weight loss/gain.   Genitourinary: Negative for flank pain, dysuria, hematuria.   Musculoskeletal: As noted in HPI.    Skin: Negative for rash.    Neurological: Negative.  Psychiatric/Behavioral: Negative for agitation.  _____________________________________________________  PHYSICAL EXAMINATION:    Blood pressure 120/70, pulse 77, temperature 97.8 °F (36.6 °C), height 5' (1.524 m), weight 74.8 kg (165 lb).    General: well developed and well nourished, alert, oriented times 3, and appears comfortable  Psychiatric: Normal  HEENT: Benign  Cardiovascular: Regular    Pulmonary: No wheezing or stridor  Abdomen: Soft, Nontender  Skin: No masses, erythema, lacerations, fluctation, ulcerations  Neurovascular: Intact    MUSCULOSKELETAL EXAMINATION:    left Hip -  Patient presents with no anatomical deformity  Ambulates with antalgic gait pattern  Uses No assistive devices  TTP over greater trochanter / trochanteric bursa, PSIS, piriformis  ROM: 15 IR, 45 ER. 100 hip flexion  MMT: 5/5 throughout  Knee flexor and extensor mechanism intact  Ankle DF and PF mechanism intact  - VIVIAN, - FADIR  - Log roll  - Squeeze test   2+ TP and DP pulses with brisk capillary refill to the toes  Sural, saphenous, tibial, superficial and deep peroneal motor and sensory distribution intact  Sensation to light touch intact         _____________________________________________________  STUDIES REVIEWED:  The attending physician has personally reviewed the pertinent films in PACS and the interpretation is as follows:    X-Ray of left hip taken on 12/31/23 were reviewed and showed mild bilateral hip degenerative  changes.     X-Ray of lumbar spine taken on 1/3/24 were reviewed demonstrating straightening of the lumbar cyst, degenerative scoliosis and diffuse disc disease and spondylosis of the lumbar spine with grade of change noted at the L3-4 level.    The x-ray report of the hip image was reviewed.    The ER note from 1/1/2024 was reviewed.      PROCEDURES:  Large joint arthrocentesis: L greater trochanteric bursa  Universal Protocol:  Consent: Verbal consent obtained.  Risks and benefits: risks, benefits and alternatives were discussed  Consent given by: patient  Timeout called at: 1/3/2024 3:47 PM.  Patient understanding: patient states understanding of the procedure being performed  Site marked: the operative site was marked  Patient identity confirmed: verbally with patient  Supporting Documentation  Indications: pain and joint swelling   Procedure Details  Location: hip - L greater trochanteric bursa  Needle gauge: Spinal needle.  Ultrasound guidance: no  Approach: lateral  Medications administered: 4 mL bupivacaine 0.25 %; 80 mg triamcinolone acetonide 40 mg/mL    Patient tolerance: patient tolerated the procedure well with no immediate complications  Dressing:  Sterile dressing applied            Scribe Attestation      I,:  Joaquin Wong am acting as a scribe while in the presence of the attending physician.:       I,:  [facility] personally performed the services described in this documentation    as scribed in my presence.:

## 2024-01-05 RX ORDER — TRIAMCINOLONE ACETONIDE 40 MG/ML
80 INJECTION, SUSPENSION INTRA-ARTICULAR; INTRAMUSCULAR
Status: COMPLETED | OUTPATIENT
Start: 2024-01-03 | End: 2024-01-03

## 2024-01-05 RX ORDER — BUPIVACAINE HYDROCHLORIDE 2.5 MG/ML
4 INJECTION, SOLUTION INFILTRATION; PERINEURAL
Status: COMPLETED | OUTPATIENT
Start: 2024-01-03 | End: 2024-01-03

## 2024-01-08 ENCOUNTER — APPOINTMENT (EMERGENCY)
Dept: RADIOLOGY | Facility: HOSPITAL | Age: 74
DRG: 871 | End: 2024-01-08
Payer: MEDICARE

## 2024-01-08 ENCOUNTER — HOSPITAL ENCOUNTER (INPATIENT)
Facility: HOSPITAL | Age: 74
LOS: 3 days | Discharge: HOME | DRG: 871 | End: 2024-01-11
Attending: EMERGENCY MEDICINE | Admitting: FAMILY MEDICINE
Payer: MEDICARE

## 2024-01-08 DIAGNOSIS — R09.02 HYPOXEMIA: ICD-10-CM

## 2024-01-08 DIAGNOSIS — J44.1 COPD EXACERBATION (HCC): Primary | ICD-10-CM

## 2024-01-08 DIAGNOSIS — B33.8 RSV INFECTION: ICD-10-CM

## 2024-01-08 PROBLEM — Z79.4 DIABETES MELLITUS TYPE 2, INSULIN DEPENDENT (HCC): Status: ACTIVE | Noted: 2020-05-05

## 2024-01-08 PROBLEM — A41.9 SEPSIS WITH ACUTE HYPOXIC RESPIRATORY FAILURE WITHOUT SEPTIC SHOCK (HCC): Status: ACTIVE | Noted: 2024-01-08

## 2024-01-08 PROBLEM — J96.01 SEPSIS WITH ACUTE HYPOXIC RESPIRATORY FAILURE WITHOUT SEPTIC SHOCK (HCC): Status: ACTIVE | Noted: 2024-01-08

## 2024-01-08 PROBLEM — F17.210 TOBACCO DEPENDENCE DUE TO CIGARETTES: Status: ACTIVE | Noted: 2022-01-28

## 2024-01-08 PROBLEM — I35.0 NONRHEUMATIC AORTIC VALVE STENOSIS: Status: ACTIVE | Noted: 2024-01-08

## 2024-01-08 PROBLEM — E83.40 DISORDER OF MAGNESIUM METABOLISM: Status: ACTIVE | Noted: 2024-01-08

## 2024-01-08 PROBLEM — R32 URINARY INCONTINENCE: Status: ACTIVE | Noted: 2023-05-04

## 2024-01-08 PROBLEM — I77.9 PAOD (PERIPHERAL ARTERIAL OCCLUSIVE DISEASE) (HCC): Status: ACTIVE | Noted: 2019-12-03

## 2024-01-08 PROBLEM — J43.8 OTHER EMPHYSEMA (HCC): Status: ACTIVE | Noted: 2024-01-08

## 2024-01-08 PROBLEM — J96.01 ACUTE HYPOXIC RESPIRATORY FAILURE (HCC): Status: ACTIVE | Noted: 2024-01-08

## 2024-01-08 PROBLEM — I50.32 CHRONIC DIASTOLIC CONGESTIVE HEART FAILURE (HCC): Status: ACTIVE | Noted: 2020-09-22

## 2024-01-08 PROBLEM — I25.10 CORONARY ARTERIOSCLEROSIS: Status: ACTIVE | Noted: 2019-12-03

## 2024-01-08 PROBLEM — E11.9 DIABETES MELLITUS TYPE 2, INSULIN DEPENDENT (HCC): Status: ACTIVE | Noted: 2020-05-05

## 2024-01-08 PROBLEM — N17.9 AKI (ACUTE KIDNEY INJURY) (HCC): Status: ACTIVE | Noted: 2024-01-08

## 2024-01-08 PROBLEM — R65.20 SEPSIS WITH ACUTE HYPOXIC RESPIRATORY FAILURE WITHOUT SEPTIC SHOCK (HCC): Status: ACTIVE | Noted: 2024-01-08

## 2024-01-08 PROBLEM — J20.5 RSV BRONCHITIS: Status: ACTIVE | Noted: 2024-01-08

## 2024-01-08 LAB
2HR DELTA HS TROPONIN: -2 NG/L
4HR DELTA HS TROPONIN: -3 NG/L
ALBUMIN SERPL BCP-MCNC: 4.4 G/DL (ref 3.5–5)
ALP SERPL-CCNC: 83 U/L (ref 34–104)
ALT SERPL W P-5'-P-CCNC: 16 U/L (ref 7–52)
ANION GAP SERPL CALCULATED.3IONS-SCNC: 10 MMOL/L
APTT PPP: 28 SECONDS (ref 23–37)
AST SERPL W P-5'-P-CCNC: 22 U/L (ref 13–39)
BACTERIA UR QL AUTO: ABNORMAL /HPF
BASE EX.OXY STD BLDV CALC-SCNC: 58.9 % (ref 60–80)
BASE EXCESS BLDV CALC-SCNC: -8 MMOL/L
BASOPHILS # BLD AUTO: 0.05 THOUSANDS/ÂΜL (ref 0–0.1)
BASOPHILS NFR BLD AUTO: 0 % (ref 0–1)
BETA-HYDROXYBUTYRATE: 0.1 MMOL/L
BILIRUB SERPL-MCNC: 0.43 MG/DL (ref 0.2–1)
BILIRUB UR QL STRIP: NEGATIVE
BNP SERPL-MCNC: 126 PG/ML (ref 0–100)
BUN SERPL-MCNC: 56 MG/DL (ref 5–25)
CALCIUM SERPL-MCNC: 9.7 MG/DL (ref 8.4–10.2)
CARDIAC TROPONIN I PNL SERPL HS: 10 NG/L
CARDIAC TROPONIN I PNL SERPL HS: 12 NG/L
CARDIAC TROPONIN I PNL SERPL HS: 9 NG/L
CHLORIDE SERPL-SCNC: 105 MMOL/L (ref 96–108)
CLARITY UR: CLEAR
CO2 SERPL-SCNC: 21 MMOL/L (ref 21–32)
COLOR UR: YELLOW
CREAT SERPL-MCNC: 1.84 MG/DL (ref 0.6–1.3)
EOSINOPHIL # BLD AUTO: 0.08 THOUSAND/ÂΜL (ref 0–0.61)
EOSINOPHIL NFR BLD AUTO: 1 % (ref 0–6)
ERYTHROCYTE [DISTWIDTH] IN BLOOD BY AUTOMATED COUNT: 13.7 % (ref 11.6–15.1)
FLUAV RNA RESP QL NAA+PROBE: NEGATIVE
FLUBV RNA RESP QL NAA+PROBE: NEGATIVE
GFR SERPL CREATININE-BSD FRML MDRD: 26 ML/MIN/1.73SQ M
GLUCOSE SERPL-MCNC: 146 MG/DL (ref 65–140)
GLUCOSE SERPL-MCNC: 273 MG/DL (ref 65–140)
GLUCOSE UR STRIP-MCNC: ABNORMAL MG/DL
HCO3 BLDV-SCNC: 19.1 MMOL/L (ref 24–30)
HCT VFR BLD AUTO: 50.7 % (ref 34.8–46.1)
HGB BLD-MCNC: 16.9 G/DL (ref 11.5–15.4)
HGB UR QL STRIP.AUTO: ABNORMAL
IMM GRANULOCYTES # BLD AUTO: 0.06 THOUSAND/UL (ref 0–0.2)
IMM GRANULOCYTES NFR BLD AUTO: 0 % (ref 0–2)
INR PPP: 0.94 (ref 0.84–1.19)
KETONES UR STRIP-MCNC: NEGATIVE MG/DL
LACTATE SERPL-SCNC: 1.8 MMOL/L (ref 0.5–2)
LEUKOCYTE ESTERASE UR QL STRIP: NEGATIVE
LYMPHOCYTES # BLD AUTO: 3.98 THOUSANDS/ÂΜL (ref 0.6–4.47)
LYMPHOCYTES NFR BLD AUTO: 27 % (ref 14–44)
MCH RBC QN AUTO: 31.6 PG (ref 26.8–34.3)
MCHC RBC AUTO-ENTMCNC: 33.3 G/DL (ref 31.4–37.4)
MCV RBC AUTO: 95 FL (ref 82–98)
MONOCYTES # BLD AUTO: 0.82 THOUSAND/ÂΜL (ref 0.17–1.22)
MONOCYTES NFR BLD AUTO: 6 % (ref 4–12)
NEUTROPHILS # BLD AUTO: 9.81 THOUSANDS/ÂΜL (ref 1.85–7.62)
NEUTS SEG NFR BLD AUTO: 66 % (ref 43–75)
NITRITE UR QL STRIP: NEGATIVE
NON-SQ EPI CELLS URNS QL MICRO: ABNORMAL /HPF
NRBC BLD AUTO-RTO: 0 /100 WBCS
O2 CT BLDV-SCNC: 13.5 ML/DL
PCO2 BLDV: 44.6 MM HG (ref 42–50)
PH BLDV: 7.25 [PH] (ref 7.3–7.4)
PH UR STRIP.AUTO: 5.5 [PH]
PLATELET # BLD AUTO: 308 THOUSANDS/UL (ref 149–390)
PMV BLD AUTO: 11.5 FL (ref 8.9–12.7)
PO2 BLDV: 33.4 MM HG (ref 35–45)
POTASSIUM SERPL-SCNC: 4.2 MMOL/L (ref 3.5–5.3)
PROCALCITONIN SERPL-MCNC: 0.13 NG/ML
PROT SERPL-MCNC: 8.4 G/DL (ref 6.4–8.4)
PROT UR STRIP-MCNC: ABNORMAL MG/DL
PROTHROMBIN TIME: 12.9 SECONDS (ref 11.6–14.5)
RBC # BLD AUTO: 5.35 MILLION/UL (ref 3.81–5.12)
RBC #/AREA URNS AUTO: ABNORMAL /HPF
RSV RNA RESP QL NAA+PROBE: POSITIVE
SARS-COV-2 RNA RESP QL NAA+PROBE: NEGATIVE
SODIUM SERPL-SCNC: 136 MMOL/L (ref 135–147)
SP GR UR STRIP.AUTO: >=1.03 (ref 1–1.03)
UROBILINOGEN UR QL STRIP.AUTO: 0.2 E.U./DL
WBC # BLD AUTO: 14.8 THOUSAND/UL (ref 4.31–10.16)
WBC #/AREA URNS AUTO: ABNORMAL /HPF

## 2024-01-08 PROCEDURE — 81001 URINALYSIS AUTO W/SCOPE: CPT | Performed by: EMERGENCY MEDICINE

## 2024-01-08 PROCEDURE — 99285 EMERGENCY DEPT VISIT HI MDM: CPT

## 2024-01-08 PROCEDURE — 82948 REAGENT STRIP/BLOOD GLUCOSE: CPT

## 2024-01-08 PROCEDURE — 85025 COMPLETE CBC W/AUTO DIFF WBC: CPT | Performed by: EMERGENCY MEDICINE

## 2024-01-08 PROCEDURE — 94640 AIRWAY INHALATION TREATMENT: CPT

## 2024-01-08 PROCEDURE — 71045 X-RAY EXAM CHEST 1 VIEW: CPT

## 2024-01-08 PROCEDURE — 94760 N-INVAS EAR/PLS OXIMETRY 1: CPT

## 2024-01-08 PROCEDURE — 80053 COMPREHEN METABOLIC PANEL: CPT | Performed by: EMERGENCY MEDICINE

## 2024-01-08 PROCEDURE — 87040 BLOOD CULTURE FOR BACTERIA: CPT | Performed by: EMERGENCY MEDICINE

## 2024-01-08 PROCEDURE — 83880 ASSAY OF NATRIURETIC PEPTIDE: CPT | Performed by: EMERGENCY MEDICINE

## 2024-01-08 PROCEDURE — 96374 THER/PROPH/DIAG INJ IV PUSH: CPT

## 2024-01-08 PROCEDURE — 83605 ASSAY OF LACTIC ACID: CPT | Performed by: EMERGENCY MEDICINE

## 2024-01-08 PROCEDURE — 93005 ELECTROCARDIOGRAM TRACING: CPT

## 2024-01-08 PROCEDURE — 0241U HB NFCT DS VIR RESP RNA 4 TRGT: CPT | Performed by: EMERGENCY MEDICINE

## 2024-01-08 PROCEDURE — 99223 1ST HOSP IP/OBS HIGH 75: CPT | Performed by: FAMILY MEDICINE

## 2024-01-08 PROCEDURE — 82010 KETONE BODYS QUAN: CPT | Performed by: EMERGENCY MEDICINE

## 2024-01-08 PROCEDURE — 84484 ASSAY OF TROPONIN QUANT: CPT | Performed by: EMERGENCY MEDICINE

## 2024-01-08 PROCEDURE — 94644 CONT INHLJ TX 1ST HOUR: CPT

## 2024-01-08 PROCEDURE — 84145 PROCALCITONIN (PCT): CPT | Performed by: EMERGENCY MEDICINE

## 2024-01-08 PROCEDURE — 85610 PROTHROMBIN TIME: CPT | Performed by: EMERGENCY MEDICINE

## 2024-01-08 PROCEDURE — 82805 BLOOD GASES W/O2 SATURATION: CPT | Performed by: EMERGENCY MEDICINE

## 2024-01-08 PROCEDURE — 85730 THROMBOPLASTIN TIME PARTIAL: CPT | Performed by: EMERGENCY MEDICINE

## 2024-01-08 PROCEDURE — 36415 COLL VENOUS BLD VENIPUNCTURE: CPT | Performed by: EMERGENCY MEDICINE

## 2024-01-08 RX ORDER — TRAMADOL HYDROCHLORIDE 50 MG/1
50 TABLET ORAL EVERY 8 HOURS PRN
Status: DISCONTINUED | OUTPATIENT
Start: 2024-01-08 | End: 2024-01-11 | Stop reason: HOSPADM

## 2024-01-08 RX ORDER — CLOPIDOGREL BISULFATE 75 MG/1
75 TABLET ORAL DAILY
Status: DISCONTINUED | OUTPATIENT
Start: 2024-01-09 | End: 2024-01-11 | Stop reason: HOSPADM

## 2024-01-08 RX ORDER — INSULIN LISPRO 100 [IU]/ML
8 INJECTION, SOLUTION INTRAVENOUS; SUBCUTANEOUS
Status: DISCONTINUED | OUTPATIENT
Start: 2024-01-09 | End: 2024-01-11 | Stop reason: HOSPADM

## 2024-01-08 RX ORDER — ATORVASTATIN CALCIUM 40 MG/1
80 TABLET, FILM COATED ORAL
Status: DISCONTINUED | OUTPATIENT
Start: 2024-01-08 | End: 2024-01-11 | Stop reason: HOSPADM

## 2024-01-08 RX ORDER — DEXAMETHASONE SODIUM PHOSPHATE 10 MG/ML
10 INJECTION, SOLUTION INTRAMUSCULAR; INTRAVENOUS ONCE
Status: COMPLETED | OUTPATIENT
Start: 2024-01-08 | End: 2024-01-08

## 2024-01-08 RX ORDER — ACETAMINOPHEN 325 MG/1
650 TABLET ORAL EVERY 6 HOURS PRN
Status: DISCONTINUED | OUTPATIENT
Start: 2024-01-08 | End: 2024-01-11 | Stop reason: HOSPADM

## 2024-01-08 RX ORDER — MAGNESIUM CHLORIDE 64 MG
64 TABLET, DELAYED RELEASE (ENTERIC COATED) ORAL DAILY
Status: DISCONTINUED | OUTPATIENT
Start: 2024-01-09 | End: 2024-01-11 | Stop reason: HOSPADM

## 2024-01-08 RX ORDER — METHYLPREDNISOLONE SODIUM SUCCINATE 40 MG/ML
40 INJECTION, POWDER, LYOPHILIZED, FOR SOLUTION INTRAMUSCULAR; INTRAVENOUS EVERY 8 HOURS
Status: DISCONTINUED | OUTPATIENT
Start: 2024-01-08 | End: 2024-01-10

## 2024-01-08 RX ORDER — GUAIFENESIN 600 MG/1
600 TABLET, EXTENDED RELEASE ORAL 2 TIMES DAILY
Status: DISCONTINUED | OUTPATIENT
Start: 2024-01-08 | End: 2024-01-11 | Stop reason: HOSPADM

## 2024-01-08 RX ORDER — INSULIN GLARGINE 100 [IU]/ML
40 INJECTION, SOLUTION SUBCUTANEOUS
Status: DISCONTINUED | OUTPATIENT
Start: 2024-01-08 | End: 2024-01-11 | Stop reason: HOSPADM

## 2024-01-08 RX ORDER — OXYCODONE HYDROCHLORIDE 5 MG/1
5 TABLET ORAL EVERY 6 HOURS PRN
Status: DISCONTINUED | OUTPATIENT
Start: 2024-01-08 | End: 2024-01-11 | Stop reason: HOSPADM

## 2024-01-08 RX ORDER — SODIUM CHLORIDE FOR INHALATION 0.9 %
3 VIAL, NEBULIZER (ML) INHALATION
Status: DISCONTINUED | OUTPATIENT
Start: 2024-01-08 | End: 2024-01-09

## 2024-01-08 RX ORDER — PANTOPRAZOLE SODIUM 20 MG/1
20 TABLET, DELAYED RELEASE ORAL
Status: DISCONTINUED | OUTPATIENT
Start: 2024-01-09 | End: 2024-01-11 | Stop reason: HOSPADM

## 2024-01-08 RX ORDER — LEVOTHYROXINE SODIUM 0.15 MG/1
150 TABLET ORAL DAILY
Status: DISCONTINUED | OUTPATIENT
Start: 2024-01-09 | End: 2024-01-11 | Stop reason: HOSPADM

## 2024-01-08 RX ORDER — MELATONIN
2000 DAILY
Status: DISCONTINUED | OUTPATIENT
Start: 2024-01-09 | End: 2024-01-11 | Stop reason: HOSPADM

## 2024-01-08 RX ORDER — GABAPENTIN 100 MG/1
100 CAPSULE ORAL 3 TIMES DAILY
Status: DISCONTINUED | OUTPATIENT
Start: 2024-01-08 | End: 2024-01-11 | Stop reason: HOSPADM

## 2024-01-08 RX ORDER — FINASTERIDE 5 MG/1
5 TABLET, FILM COATED ORAL DAILY
Status: DISCONTINUED | OUTPATIENT
Start: 2024-01-09 | End: 2024-01-11 | Stop reason: HOSPADM

## 2024-01-08 RX ORDER — HEPARIN SODIUM 5000 [USP'U]/ML
5000 INJECTION, SOLUTION INTRAVENOUS; SUBCUTANEOUS EVERY 8 HOURS SCHEDULED
Status: DISCONTINUED | OUTPATIENT
Start: 2024-01-08 | End: 2024-01-11 | Stop reason: HOSPADM

## 2024-01-08 RX ORDER — LEVALBUTEROL INHALATION SOLUTION 1.25 MG/3ML
1.25 SOLUTION RESPIRATORY (INHALATION)
Status: DISCONTINUED | OUTPATIENT
Start: 2024-01-08 | End: 2024-01-11 | Stop reason: HOSPADM

## 2024-01-08 RX ORDER — ESCITALOPRAM OXALATE 10 MG/1
20 TABLET ORAL DAILY
Status: DISCONTINUED | OUTPATIENT
Start: 2024-01-09 | End: 2024-01-11 | Stop reason: HOSPADM

## 2024-01-08 RX ORDER — SODIUM CHLORIDE FOR INHALATION 0.9 %
12 VIAL, NEBULIZER (ML) INHALATION ONCE
Status: COMPLETED | OUTPATIENT
Start: 2024-01-08 | End: 2024-01-08

## 2024-01-08 RX ORDER — INSULIN LISPRO 100 [IU]/ML
1-6 INJECTION, SOLUTION INTRAVENOUS; SUBCUTANEOUS
Status: DISCONTINUED | OUTPATIENT
Start: 2024-01-08 | End: 2024-01-11 | Stop reason: HOSPADM

## 2024-01-08 RX ORDER — SODIUM CHLORIDE, SODIUM GLUCONATE, SODIUM ACETATE, POTASSIUM CHLORIDE, MAGNESIUM CHLORIDE, SODIUM PHOSPHATE, DIBASIC, AND POTASSIUM PHOSPHATE .53; .5; .37; .037; .03; .012; .00082 G/100ML; G/100ML; G/100ML; G/100ML; G/100ML; G/100ML; G/100ML
75 INJECTION, SOLUTION INTRAVENOUS CONTINUOUS
Status: DISCONTINUED | OUTPATIENT
Start: 2024-01-08 | End: 2024-01-10

## 2024-01-08 RX ADMIN — SODIUM CHLORIDE, SODIUM GLUCONATE, SODIUM ACETATE, POTASSIUM CHLORIDE, MAGNESIUM CHLORIDE, SODIUM PHOSPHATE, DIBASIC, AND POTASSIUM PHOSPHATE 75 ML/HR: .53; .5; .37; .037; .03; .012; .00082 INJECTION, SOLUTION INTRAVENOUS at 20:25

## 2024-01-08 RX ADMIN — Medication 12 ML: at 16:32

## 2024-01-08 RX ADMIN — ATORVASTATIN CALCIUM 80 MG: 40 TABLET, FILM COATED ORAL at 21:27

## 2024-01-08 RX ADMIN — GABAPENTIN 100 MG: 100 CAPSULE ORAL at 20:23

## 2024-01-08 RX ADMIN — INSULIN GLARGINE 40 UNITS: 100 INJECTION, SOLUTION SUBCUTANEOUS at 21:40

## 2024-01-08 RX ADMIN — ISODIUM CHLORIDE 3 ML: 0.03 SOLUTION RESPIRATORY (INHALATION) at 22:30

## 2024-01-08 RX ADMIN — DEXAMETHASONE SODIUM PHOSPHATE 10 MG: 10 INJECTION, SOLUTION INTRAMUSCULAR; INTRAVENOUS at 16:16

## 2024-01-08 RX ADMIN — ACETAMINOPHEN 650 MG: 325 TABLET, FILM COATED ORAL at 21:40

## 2024-01-08 RX ADMIN — HEPARIN SODIUM 5000 UNITS: 5000 INJECTION, SOLUTION INTRAVENOUS; SUBCUTANEOUS at 21:27

## 2024-01-08 RX ADMIN — AZITHROMYCIN MONOHYDRATE 500 MG: 500 INJECTION, POWDER, LYOPHILIZED, FOR SOLUTION INTRAVENOUS at 21:27

## 2024-01-08 RX ADMIN — GUAIFENESIN 600 MG: 600 TABLET ORAL at 20:23

## 2024-01-08 RX ADMIN — METHYLPREDNISOLONE SODIUM SUCCINATE 40 MG: 40 INJECTION, POWDER, FOR SOLUTION INTRAMUSCULAR; INTRAVENOUS at 20:22

## 2024-01-08 RX ADMIN — LEVALBUTEROL HYDROCHLORIDE 1.25 MG: 1.25 SOLUTION RESPIRATORY (INHALATION) at 22:30

## 2024-01-08 RX ADMIN — IPRATROPIUM BROMIDE 0.5 MG: 0.5 SOLUTION RESPIRATORY (INHALATION) at 22:30

## 2024-01-08 RX ADMIN — ALBUTEROL SULFATE 10 MG: 2.5 SOLUTION RESPIRATORY (INHALATION) at 16:31

## 2024-01-08 RX ADMIN — OXYCODONE HYDROCHLORIDE 5 MG: 5 TABLET ORAL at 23:52

## 2024-01-08 RX ADMIN — IPRATROPIUM BROMIDE 1 MG: 0.5 SOLUTION RESPIRATORY (INHALATION) at 16:31

## 2024-01-08 RX ADMIN — INSULIN LISPRO 4 UNITS: 100 INJECTION, SOLUTION INTRAVENOUS; SUBCUTANEOUS at 21:28

## 2024-01-08 NOTE — ED NOTES
Called respiratory and made aware neb treatment needed for pt.Per respiratory they will be down after finish with their present pt.     Ema Ko, JULIETTE  01/08/24 0324

## 2024-01-08 NOTE — ED PROVIDER NOTES
History  Chief Complaint   Patient presents with    Shortness of Breath     Pt reports sob, concerned for covid x four days ago     73-year-old female describes worsening URI type symptoms with cough and thick sputum production over the past 5 days, unable to smoke, seen in urgent care but mostly for a hip issue.  Notes she has no history of emphysema and does not use bronchodilators or nebulizer.  Past medical history includes CAD, insulin-dependent diabetes.  She denies prior history of VTE and hemoptysis.      History provided by:  Patient  History limited by:  Acuity of condition  Shortness of Breath  Severity:  Severe  Onset quality:  Gradual  Timing:  Constant  Progression:  Worsening  Chronicity:  New  Context: URI    Relieved by:  None tried  Worsened by:  Nothing  Associated symptoms: cough and sputum production    Associated symptoms: no abdominal pain, no chest pain, no fever, no hemoptysis and no vomiting    Risk factors: tobacco use    Risk factors: no hx of PE/DVT        Prior to Admission Medications   Prescriptions Last Dose Informant Patient Reported? Taking?   Cholecalciferol 50 MCG (2000 UT) CAPS 1/8/2024  Yes Yes   Sig: Take 2,000 Units by mouth daily   Empagliflozin (JARDIANCE) 10 MG TABS tablet Not Taking  Yes No   Sig: Take 10 mg by mouth daily   Patient not taking: Reported on 1/8/2024   Magnesium Oxide 500 MG TABS 1/8/2024  Yes Yes   Sig: Take 1,000 mg by mouth 3 (three) times a day   Omega-3 Fatty Acids (fish oil) 1,000 mg   Yes No   Sig: Take 2 g by mouth 2 (two) times a day   aspirin (ECOTRIN LOW STRENGTH) 81 mg EC tablet 1/8/2024  Yes Yes   Sig: Take 81 mg by mouth daily   atorvastatin (LIPITOR) 80 mg tablet 1/7/2024  Yes Yes   Sig: Take 80 mg by mouth daily at bedtime   clopidogrel (PLAVIX) 75 mg tablet 1/8/2024  Yes Yes   Sig: Take 75 mg by mouth daily   escitalopram (LEXAPRO) 20 mg tablet 1/8/2024  Yes Yes   Sig: Take 20 mg by mouth daily   fenofibrate (TRICOR) 48 mg tablet 1/8/2024   Yes Yes   finasteride (PROSCAR) 5 mg tablet 1/8/2024  Yes Yes   Sig: Take 5 mg by mouth daily   gabapentin (NEURONTIN) 100 mg capsule 1/8/2024  Yes Yes   Sig: Take 100 mg by mouth 3 (three) times a day   insulin aspart (NovoLOG FlexPen) 100 UNIT/ML injection pen 1/8/2024  Yes Yes   Sig: 3 (three) times a day   insulin glargine (LANTUS SOLOSTAR) 100 units/mL injection pen 1/8/2024  Yes Yes   Sig: Inject 48 Units under the skin daily at bedtime   levothyroxine 125 mcg tablet  Self Yes No   Sig: Take 150 mcg by mouth daily   lidocaine (Lidoderm) 5 % Past Week  No Yes   Sig: Apply 1 patch topically over 12 hours daily for 15 days Remove & Discard patch within 12 hours or as directed by MD   lisinopril (ZESTRIL) 20 mg tablet 1/8/2024  Yes Yes   Sig: Take 20 mg by mouth daily   magnesium 30 MG tablet 1/8/2024 Self Yes Yes   Sig: Take 71.5 mg by mouth 2 (two) times a day 71.5mg OTC - magnesium chloride (slow Mag)   metoprolol tartrate (LOPRESSOR) 25 mg tablet 1/8/2024  Yes Yes   Sig: Take 25 mg by mouth 2 (two) times a day   omeprazole (PriLOSEC OTC) 20 MG tablet 1/8/2024  Yes Yes   Sig: Take 20 mg by mouth daily   oxyCODONE-acetaminophen (Percocet) 5-325 mg per tablet 1/7/2024  No Yes   Sig: Take 1 tablet by mouth every 4 (four) hours as needed for moderate pain for up to 10 doses Max Daily Amount: 6 tablets   traMADol (Ultram) 50 mg tablet 1/7/2024  No Yes   Sig: Take 1 tablet (50 mg total) by mouth every 6 (six) hours as needed for moderate pain      Facility-Administered Medications: None       Past Medical History:   Diagnosis Date    CHF (congestive heart failure) (HCC)     Coronary artery disease     Diabetes mellitus (HCC)     Disease of thyroid gland        Past Surgical History:   Procedure Laterality Date    APPENDECTOMY      CARDIAC SURGERY      CORONARY ANGIOPLASTY WITH STENT PLACEMENT      HERNIA REPAIR         History reviewed. No pertinent family history.  I have reviewed and agree with the history as  documented.    E-Cigarette/Vaping    E-Cigarette Use Never User      E-Cigarette/Vaping Substances     Social History     Tobacco Use    Smoking status: Every Day     Current packs/day: 0.25     Types: Cigarettes    Smokeless tobacco: Never   Vaping Use    Vaping status: Never Used   Substance Use Topics    Alcohol use: Not Currently    Drug use: Yes     Types: Marijuana       Review of Systems   Constitutional:  Negative for fever.   Respiratory:  Positive for cough, sputum production and shortness of breath. Negative for hemoptysis.    Cardiovascular:  Negative for chest pain.   Gastrointestinal:  Negative for abdominal pain and vomiting.   All other systems reviewed and are negative.      Physical Exam  Physical Exam  Vitals and nursing note reviewed.   Constitutional:       General: She is in acute distress.      Comments: Pleasant, uncomfortable-appearing, frequent coughing hacking and gagging and spitting, appears moderately short of breath, tachypneic   HENT:      Head: Normocephalic and atraumatic.      Mouth/Throat:      Mouth: Mucous membranes are moist.      Pharynx: Oropharynx is clear.   Eyes:      Conjunctiva/sclera: Conjunctivae normal.      Pupils: Pupils are equal, round, and reactive to light.   Cardiovascular:      Rate and Rhythm: Normal rate and regular rhythm.      Heart sounds: Normal heart sounds.   Pulmonary:      Effort: Pulmonary effort is normal.      Breath sounds: Decreased breath sounds, wheezing and rhonchi present. No rales.   Abdominal:      General: Bowel sounds are normal. There is no distension.      Palpations: Abdomen is soft.      Tenderness: There is no abdominal tenderness.   Musculoskeletal:         General: No deformity.      Cervical back: Neck supple.      Right lower leg: No edema.      Left lower leg: No edema.   Skin:     General: Skin is warm and dry.   Neurological:      General: No focal deficit present.      Mental Status: She is alert and oriented to person,  place, and time.      Cranial Nerves: No cranial nerve deficit.      Coordination: Coordination normal.   Psychiatric:         Behavior: Behavior normal.         Thought Content: Thought content normal.         Judgment: Judgment normal.         Vital Signs  ED Triage Vitals   Temperature Pulse Respirations Blood Pressure SpO2   01/08/24 1553 01/08/24 1552 01/08/24 1552 01/08/24 1556 01/08/24 1552   98.1 °F (36.7 °C) 70 (!) 26 (!) 178/76 92 %      Temp Source Heart Rate Source Patient Position - Orthostatic VS BP Location FiO2 (%)   01/08/24 1553 01/08/24 1552 01/08/24 1552 01/08/24 1552 --   Temporal Monitor Sitting Right arm       Pain Score       01/08/24 1552       4           Vitals:    01/08/24 1930 01/08/24 2010 01/08/24 2017 01/08/24 2147   BP: 121/72 102/77 102/77 (!) 142/114   Pulse: 79 76 76 77   Patient Position - Orthostatic VS: Sitting            Visual Acuity      ED Medications  Medications   aspirin (ECOTRIN LOW STRENGTH) EC tablet 81 mg (has no administration in time range)   atorvastatin (LIPITOR) tablet 80 mg (80 mg Oral Given 1/8/24 2127)   cholecalciferol (VITAMIN D3) tablet 2,000 Units (has no administration in time range)   clopidogrel (PLAVIX) tablet 75 mg (has no administration in time range)   escitalopram (LEXAPRO) tablet 20 mg (has no administration in time range)   finasteride (PROSCAR) tablet 5 mg (has no administration in time range)   gabapentin (NEURONTIN) capsule 100 mg (100 mg Oral Given 1/8/24 2023)   levothyroxine tablet 150 mcg (has no administration in time range)   magnesium chloride (MAG64) EC tablet TBEC 64 mg (has no administration in time range)   metoprolol tartrate (LOPRESSOR) tablet 25 mg (25 mg Oral Not Given 1/8/24 2023)   pantoprazole (PROTONIX) EC tablet 20 mg (has no administration in time range)   multi-electrolyte (PLASMALYTE-A/ISOLYTE-S PH 7.4) IV solution (75 mL/hr Intravenous New Bag 1/8/24 2025)   acetaminophen (TYLENOL) tablet 650 mg (650 mg Oral Given  1/8/24 2140)   nicotine (NICODERM CQ) 7 mg/24hr TD 24 hr patch 1 patch (has no administration in time range)   guaiFENesin (MUCINEX) 12 hr tablet 600 mg (600 mg Oral Given 1/8/24 2023)   levalbuterol (XOPENEX) inhalation solution 1.25 mg (1.25 mg Nebulization Given 1/8/24 2230)     And   sodium chloride 0.9 % inhalation solution 3 mL (3 mL Nebulization Given 1/8/24 2230)   ipratropium (ATROVENT) 0.02 % inhalation solution 0.5 mg (0.5 mg Nebulization Given 1/8/24 2230)   methylPREDNISolone sodium succinate (Solu-MEDROL) injection 40 mg (40 mg Intravenous Given 1/8/24 2022)   azithromycin (ZITHROMAX) 500 mg in sodium chloride 0.9 % 250 mL IVPB (500 mg Intravenous New Bag 1/8/24 2127)   heparin (porcine) subcutaneous injection 5,000 Units (5,000 Units Subcutaneous Given 1/8/24 2127)   insulin glargine (LANTUS) subcutaneous injection 40 Units 0.4 mL (40 Units Subcutaneous Given 1/8/24 2140)   insulin lispro (HumaLOG) 100 units/mL subcutaneous injection 8 Units (has no administration in time range)   insulin lispro (HumaLOG) 100 units/mL subcutaneous injection 1-6 Units (4 Units Subcutaneous Given 1/8/24 2128)   traMADol (ULTRAM) tablet 50 mg (has no administration in time range)   oxyCODONE (ROXICODONE) IR tablet 5 mg (has no administration in time range)   albuterol inhalation solution 10 mg (10 mg Nebulization Given 1/8/24 1631)   ipratropium (ATROVENT) 0.02 % inhalation solution 1 mg (1 mg Nebulization Given 1/8/24 1631)   sodium chloride 0.9 % inhalation solution 12 mL (12 mL Nebulization Given 1/8/24 1632)   dexamethasone (PF) (DECADRON) injection 10 mg (10 mg Intravenous Given 1/8/24 1616)       Diagnostic Studies  Results Reviewed       Procedure Component Value Units Date/Time    Blood culture #1 [[id number]] Collected: 01/08/24 1633    Lab Status: Preliminary result Specimen: Blood from Arm, Left Updated: 01/08/24 2304     Blood Culture Received in Microbiology Lab. Culture in Progress.    Blood culture #2  [[id number]] Collected: 01/08/24 1605    Lab Status: Preliminary result Specimen: Blood from Arm, Right Updated: 01/08/24 2301     Blood Culture Received in Microbiology Lab. Culture in Progress.    HS Troponin I 4hr [[id number]]  (Normal) Collected: 01/08/24 1955    Lab Status: Final result Specimen: Blood from Arm, Right Updated: 01/08/24 2028     hs TnI 4hr 9 ng/L      Delta 4hr hsTnI -3 ng/L     HS Troponin I 2hr [[id number]]  (Normal) Collected: 01/08/24 1821    Lab Status: Final result Specimen: Blood from Arm, Right Updated: 01/08/24 1848     hs TnI 2hr 10 ng/L      Delta 2hr hsTnI -2 ng/L     Urine Microscopic [[id number]]  (Abnormal) Collected: 01/08/24 1818    Lab Status: Final result Specimen: Urine, Clean Catch Updated: 01/08/24 1840     RBC, UA 4-10 /hpf      WBC, UA None Seen /hpf      Epithelial Cells Occasional /hpf      Bacteria, UA None Seen /hpf     UA w Reflex to Microscopic w Reflex to Culture [[id number]]  (Abnormal) Collected: 01/08/24 1818    Lab Status: Final result Specimen: Urine, Clean Catch Updated: 01/08/24 1830     Color, UA Yellow     Clarity, UA Clear     Specific Gravity, UA >=1.030     pH, UA 5.5     Leukocytes, UA Negative     Nitrite, UA Negative     Protein,  (2+) mg/dl      Glucose,  (1/10%) mg/dl      Ketones, UA Negative mg/dl      Urobilinogen, UA 0.2 E.U./dl      Bilirubin, UA Negative     Occult Blood, UA Small    Procalcitonin [[id number]]  (Normal) Collected: 01/08/24 1605    Lab Status: Final result Specimen: Blood from Arm, Right Updated: 01/08/24 1714     Procalcitonin 0.13 ng/ml     Narrative:      Specimen Lipemic.    FLU/RSV/COVID - if FLU/RSV clinically relevant [[id number]]  (Abnormal) Collected: 01/08/24 1605    Lab Status: Final result Specimen: Nares from Nose Updated: 01/08/24 1710     SARS-CoV-2 Negative     INFLUENZA A PCR Negative     INFLUENZA B PCR Negative     RSV PCR Positive    Narrative:      FOR PEDIATRIC PATIENTS - copy/paste COVID Guidelines  URL to browser: [email or web address]    SARS-CoV-2 assay is a Nucleic Acid Amplification assay intended for the  qualitative detection of nucleic acid from SARS-CoV-2 in nasopharyngeal  swabs. Results are for the presumptive identification of SARS-CoV-2 RNA.    Positive results are indicative of infection with SARS-CoV-2, the virus  causing COVID-19, but do not rule out bacterial infection or co-infection  with other viruses. Laboratories within the [place] and its  territories are required to report all positive results to the appropriate  public health authorities. Negative results do not preclude SARS-CoV-2  infection and should not be used as the sole basis for treatment or other  patient management decisions. Negative results must be combined with  clinical observations, patient history, and epidemiological information.  This test has not been FDA cleared or approved.    This test has been authorized by FDA under an Emergency Use Authorization  (EUA). This test is only authorized for the duration of time the  declaration that circumstances exist justifying the authorization of the  emergency use of an in vitro diagnostic tests for detection of SARS-CoV-2  virus and/or diagnosis of COVID-19 infection under section 564(b)(1) of  the Act, 21 U.S.C. 360bbb-3(b)(1), unless the authorization is terminated  or revoked sooner. The test has been validated but independent review by FDA  and CLIA is pending.    Test performed using [employer] GeneXpert: This RT-PCR assay targets N2,  a region unique to SARS-CoV-2. A conserved region in the E-gene was chosen  for pan-Sarbecovirus detection which includes SARS-CoV-2.    According to CMS-2020-01-R, this platform meets the definition of high-throughput technology.    Comprehensive metabolic panel [[id number]]  (Abnormal) Collected: 01/08/24 1605    Lab Status: Final result Specimen: Blood from Arm, Right Updated:  01/08/24 1651     Sodium 136 mmol/L      Potassium 4.2 mmol/L      Chloride 105 mmol/L      CO2 21 mmol/L      ANION GAP 10 mmol/L      BUN 56 mg/dL      Creatinine 1.84 mg/dL      Glucose 146 mg/dL      Calcium 9.7 mg/dL      AST 22 U/L      ALT 16 U/L      Alkaline Phosphatase 83 U/L      Total Protein 8.4 g/dL      Albumin 4.4 g/dL      Total Bilirubin 0.43 mg/dL      eGFR 26 ml/min/1.73sq m     Narrative:      Specimen Lipemic.  National Kidney Disease Foundation guidelines for Chronic Kidney Disease (CKD):     Stage 1 with normal or high GFR (GFR > 90 mL/min/1.73 square meters)    Stage 2 Mild CKD (GFR = 60-89 mL/min/1.73 square meters)    Stage 3A Moderate CKD (GFR = 45-59 mL/min/1.73 square meters)    Stage 3B Moderate CKD (GFR = 30-44 mL/min/1.73 square meters)    Stage 4 Severe CKD (GFR = 15-29 mL/min/1.73 square meters)    Stage 5 End Stage CKD (GFR <15 mL/min/1.73 square meters)  Note: GFR calculation is accurate only with a steady state creatinine    Lactic acid [[id number]]  (Normal) Collected: 01/08/24 1605    Lab Status: Final result Specimen: Blood from Arm, Right Updated: 01/08/24 1651     LACTIC ACID 1.8 mmol/L     Narrative:      Specimen Lipemic.  Result may be elevated if tourniquet was used during collection.    B-Type Natriuretic Peptide(BNP) [[id number]]  (Abnormal) Collected: 01/08/24 1605    Lab Status: Final result Specimen: Blood from Arm, Right Updated: 01/08/24 1647      pg/mL     HS Troponin 0hr (reflex protocol) [[id number]]  (Normal) Collected: 01/08/24 1605    Lab Status: Final result Specimen: Blood from Arm, Right Updated: 01/08/24 1644     hs TnI 0hr 12 ng/L     Protime-INR [[id number]]  (Normal) Collected: 01/08/24 1605    Lab Status: Final result Specimen: Blood from Arm, Right Updated: 01/08/24 1634     Protime 12.9 seconds      INR 0.94    APTT [[id number]]  (Normal) Collected: 01/08/24 1605    Lab Status: Final result Specimen: Blood from Arm, Right Updated: 01/08/24 1906      PTT 28 seconds     Beta Hydroxybutyrate [[id number]]  (Normal) Collected: 01/08/24 1605    Lab Status: Final result Specimen: Blood from Arm, Right Updated: 01/08/24 1628     BETA-HYDROXYBUTYRATE 0.1 mmol/L     Blood gas, Venous [[id number]]  (Abnormal) Collected: 01/08/24 1605    Lab Status: Final result Specimen: Blood from Arm, Right Updated: 01/08/24 1622     pH, Gio 7.249     pCO2, Gio 44.6 mm Hg      pO2, Gio 33.4 mm Hg      HCO3, Gio 19.1 mmol/L      Base Excess, Gio -8.0 mmol/L      O2 Content, Gio 13.5 ml/dL      O2 HGB, VENOUS 58.9 %     CBC and differential [[id number]]  (Abnormal) Collected: 01/08/24 1605    Lab Status: Final result Specimen: Blood from Arm, Right Updated: 01/08/24 1620     WBC 14.80 Thousand/uL      RBC 5.35 Million/uL      Hemoglobin 16.9 g/dL      Hematocrit 50.7 %      MCV 95 fL      MCH 31.6 pg      MCHC 33.3 g/dL      RDW 13.7 %      MPV 11.5 fL      Platelets 308 Thousands/uL      nRBC 0 /100 WBCs      Neutrophils Relative 66 %      Immat GRANS % 0 %      Lymphocytes Relative 27 %      Monocytes Relative 6 %      Eosinophils Relative 1 %      Basophils Relative 0 %      Neutrophils Absolute 9.81 Thousands/µL      Immature Grans Absolute 0.06 Thousand/uL      Lymphocytes Absolute 3.98 Thousands/µL      Monocytes Absolute 0.82 Thousand/µL      Eosinophils Absolute 0.08 Thousand/µL      Basophils Absolute 0.05 Thousands/µL                    XR chest portable    (Results Pending)   CT chest wo contrast    (Results Pending)              Procedures     Critical care time 30 minutes not including billable procedures      ED Course  ED Course as of 01/08/24 2334   Mon Jan 08, 2024   1559 EKG 1550 5 PM normal sinus rhythm rate 77 left axis normal intervals Q-wave V1 lateral T wave inversion with ST depression Q-wave V3 interpreted by me   1644 pH, Gio(!): 7.249   1644 WBC(!): 14.80   1644 BETA-HYDROXYBUTYRATE: 0.1   1644 pO2, Gio(!): 33.4   1644 HCO3, Gio(!): 19.1   1644 XR chest  portable  No infiltrate or cardiomegaly, no acute cardiopulmonary changes   1720 RSV PCR(!): Positive   1720 Procalcitonin: 0.13   1720 LACTIC ACID: 1.8   1720 hs TnI 0hr: 12   1736 Appears markedly more comfortable, notes she is feeling better, increased breath sounds bilaterally, has coughing fit, plan to complete breathing treatment, evaluate ambulatory oxygen   1816 Hematuria pulse oxygenation 89% on room air, dyspneic and agreeable with hospitalization for further treatment                               SBIRT 22yo+      Flowsheet Row Most Recent Value   Initial Alcohol Screen: US AUDIT-C     1. How often do you have a drink containing alcohol? 0 Filed at: 01/08/2024 1551   2. How many drinks containing alcohol do you have on a typical day you are drinking?  0 Filed at: 01/08/2024 1551   3a. Male UNDER 65: How often do you have five or more drinks on one occasion? 0 Filed at: 01/08/2024 1551   3b. FEMALE Any Age, or MALE 65+: How often do you have 4 or more drinks on one occassion? 0 Filed at: 01/08/2024 1551   Audit-C Score 0 Filed at: 01/08/2024 1551   CLIFFORD: How many times in the past year have you...    Used an illegal drug or used a prescription medication for non-medical reasons? Never Filed at: 01/08/2024 1551                      Medical Decision Making  Amount and/or Complexity of Data Reviewed  Labs: ordered. Decision-making details documented in ED Course.  Radiology: ordered and independent interpretation performed. Decision-making details documented in ED Course.  ECG/medicine tests: ordered and independent interpretation performed. Decision-making details documented in ED Course.    Risk  Prescription drug management.  Decision regarding hospitalization.             Disposition  Final diagnoses:   COPD exacerbation (HCC)   RSV infection   Hypoxemia     Time reflects when diagnosis was documented in both MDM as applicable and the Disposition within this note       Time User Action Codes Description  Comment    1/8/2024  6:29 PM Solomon Steele Add [J44.1] COPD exacerbation (HCC)     1/8/2024  6:30 PM Solomon Steele Add [B33.8] RSV infection     1/8/2024  6:30 PM Solomon Setele Add [R09.02] Hypoxemia           ED Disposition       ED Disposition   Admit    Condition   Stable    Date/Time   Mon Jan 8, 2024 6912    Comment   Case was discussed with Dr. Burns and the patient's admission status was agreed to be Admission Status: inpatient status to the service of Dr. Mustafa.               Follow-up Information    None         Current Discharge Medication List        CONTINUE these medications which have NOT CHANGED    Details   aspirin (ECOTRIN LOW STRENGTH) 81 mg EC tablet Take 81 mg by mouth daily      atorvastatin (LIPITOR) 80 mg tablet Take 80 mg by mouth daily at bedtime      Cholecalciferol 50 MCG (2000 UT) CAPS Take 2,000 Units by mouth daily      clopidogrel (PLAVIX) 75 mg tablet Take 75 mg by mouth daily      escitalopram (LEXAPRO) 20 mg tablet Take 20 mg by mouth daily      fenofibrate (TRICOR) 48 mg tablet       finasteride (PROSCAR) 5 mg tablet Take 5 mg by mouth daily      gabapentin (NEURONTIN) 100 mg capsule Take 100 mg by mouth 3 (three) times a day      insulin aspart (NovoLOG FlexPen) 100 UNIT/ML injection pen 3 (three) times a day      insulin glargine (LANTUS SOLOSTAR) 100 units/mL injection pen Inject 48 Units under the skin daily at bedtime      lidocaine (Lidoderm) 5 % Apply 1 patch topically over 12 hours daily for 15 days Remove & Discard patch within 12 hours or as directed by MD  Qty: 15 patch, Refills: 0    Associated Diagnoses: Left hip pain      lisinopril (ZESTRIL) 20 mg tablet Take 20 mg by mouth daily      magnesium 30 MG tablet Take 71.5 mg by mouth 2 (two) times a day 71.5mg OTC - magnesium chloride (slow Mag)      Magnesium Oxide 500 MG TABS Take 1,000 mg by mouth 3 (three) times a day      metoprolol tartrate (LOPRESSOR) 25 mg tablet Take 25 mg by mouth 2 (two) times a day       omeprazole (PriLOSEC OTC) 20 MG tablet Take 20 mg by mouth daily      oxyCODONE-acetaminophen (Percocet) 5-325 mg per tablet Take 1 tablet by mouth every 4 (four) hours as needed for moderate pain for up to 10 doses Max Daily Amount: 6 tablets  Qty: 10 tablet, Refills: 0    Associated Diagnoses: Left hip pain      traMADol (Ultram) 50 mg tablet Take 1 tablet (50 mg total) by mouth every 6 (six) hours as needed for moderate pain  Qty: 20 tablet, Refills: 0    Associated Diagnoses: Pain of left hip      Empagliflozin (JARDIANCE) 10 MG TABS tablet Take 10 mg by mouth daily      levothyroxine 125 mcg tablet Take 150 mcg by mouth daily      Omega-3 Fatty Acids (fish oil) 1,000 mg Take 2 g by mouth 2 (two) times a day             No discharge procedures on file.    PDMP Review       None            ED Provider  Electronically Signed by             Solomon Steele DO  01/08/24 4528

## 2024-01-09 ENCOUNTER — APPOINTMENT (INPATIENT)
Dept: CT IMAGING | Facility: HOSPITAL | Age: 74
DRG: 871 | End: 2024-01-09
Payer: MEDICARE

## 2024-01-09 LAB
ANION GAP SERPL CALCULATED.3IONS-SCNC: 7 MMOL/L
BUN SERPL-MCNC: 50 MG/DL (ref 5–25)
CALCIUM SERPL-MCNC: 9.1 MG/DL (ref 8.4–10.2)
CHLORIDE SERPL-SCNC: 108 MMOL/L (ref 96–108)
CO2 SERPL-SCNC: 19 MMOL/L (ref 21–32)
CREAT SERPL-MCNC: 1.45 MG/DL (ref 0.6–1.3)
ERYTHROCYTE [DISTWIDTH] IN BLOOD BY AUTOMATED COUNT: 13.5 % (ref 11.6–15.1)
GFR SERPL CREATININE-BSD FRML MDRD: 36 ML/MIN/1.73SQ M
GLUCOSE SERPL-MCNC: 243 MG/DL (ref 65–140)
GLUCOSE SERPL-MCNC: 265 MG/DL (ref 65–140)
GLUCOSE SERPL-MCNC: 277 MG/DL (ref 65–140)
GLUCOSE SERPL-MCNC: 298 MG/DL (ref 65–140)
GLUCOSE SERPL-MCNC: 301 MG/DL (ref 65–140)
HCT VFR BLD AUTO: 42.8 % (ref 34.8–46.1)
HGB BLD-MCNC: 14 G/DL (ref 11.5–15.4)
MAGNESIUM SERPL-MCNC: 1.8 MG/DL (ref 1.9–2.7)
MCH RBC QN AUTO: 30.8 PG (ref 26.8–34.3)
MCHC RBC AUTO-ENTMCNC: 32.7 G/DL (ref 31.4–37.4)
MCV RBC AUTO: 94 FL (ref 82–98)
PLATELET # BLD AUTO: 222 THOUSANDS/UL (ref 149–390)
PMV BLD AUTO: 11.5 FL (ref 8.9–12.7)
POTASSIUM SERPL-SCNC: 4.9 MMOL/L (ref 3.5–5.3)
RBC # BLD AUTO: 4.55 MILLION/UL (ref 3.81–5.12)
SODIUM SERPL-SCNC: 134 MMOL/L (ref 135–147)
WBC # BLD AUTO: 11.54 THOUSAND/UL (ref 4.31–10.16)

## 2024-01-09 PROCEDURE — 80048 BASIC METABOLIC PNL TOTAL CA: CPT | Performed by: FAMILY MEDICINE

## 2024-01-09 PROCEDURE — G1004 CDSM NDSC: HCPCS

## 2024-01-09 PROCEDURE — 82948 REAGENT STRIP/BLOOD GLUCOSE: CPT

## 2024-01-09 PROCEDURE — 71250 CT THORAX DX C-: CPT

## 2024-01-09 PROCEDURE — 99233 SBSQ HOSP IP/OBS HIGH 50: CPT | Performed by: HOSPITALIST

## 2024-01-09 PROCEDURE — 94640 AIRWAY INHALATION TREATMENT: CPT

## 2024-01-09 PROCEDURE — 93005 ELECTROCARDIOGRAM TRACING: CPT

## 2024-01-09 PROCEDURE — 83735 ASSAY OF MAGNESIUM: CPT | Performed by: FAMILY MEDICINE

## 2024-01-09 PROCEDURE — 85027 COMPLETE CBC AUTOMATED: CPT | Performed by: FAMILY MEDICINE

## 2024-01-09 PROCEDURE — 94760 N-INVAS EAR/PLS OXIMETRY 1: CPT

## 2024-01-09 RX ORDER — MAGNESIUM SULFATE HEPTAHYDRATE 40 MG/ML
2 INJECTION, SOLUTION INTRAVENOUS ONCE
Qty: 50 ML | Refills: 0 | Status: COMPLETED | OUTPATIENT
Start: 2024-01-09 | End: 2024-01-09

## 2024-01-09 RX ADMIN — METHYLPREDNISOLONE SODIUM SUCCINATE 40 MG: 40 INJECTION, POWDER, FOR SOLUTION INTRAMUSCULAR; INTRAVENOUS at 19:56

## 2024-01-09 RX ADMIN — IPRATROPIUM BROMIDE 0.5 MG: 0.5 SOLUTION RESPIRATORY (INHALATION) at 14:10

## 2024-01-09 RX ADMIN — INSULIN LISPRO 4 UNITS: 100 INJECTION, SOLUTION INTRAVENOUS; SUBCUTANEOUS at 11:48

## 2024-01-09 RX ADMIN — CLOPIDOGREL 75 MG: 75 TABLET ORAL at 08:07

## 2024-01-09 RX ADMIN — GABAPENTIN 100 MG: 100 CAPSULE ORAL at 08:07

## 2024-01-09 RX ADMIN — HEPARIN SODIUM 5000 UNITS: 5000 INJECTION, SOLUTION INTRAVENOUS; SUBCUTANEOUS at 05:55

## 2024-01-09 RX ADMIN — INSULIN GLARGINE 40 UNITS: 100 INJECTION, SOLUTION SUBCUTANEOUS at 20:49

## 2024-01-09 RX ADMIN — FINASTERIDE 5 MG: 5 TABLET, FILM COATED ORAL at 08:07

## 2024-01-09 RX ADMIN — IPRATROPIUM BROMIDE 0.5 MG: 0.5 SOLUTION RESPIRATORY (INHALATION) at 19:28

## 2024-01-09 RX ADMIN — LEVALBUTEROL HYDROCHLORIDE 1.25 MG: 1.25 SOLUTION RESPIRATORY (INHALATION) at 08:41

## 2024-01-09 RX ADMIN — IPRATROPIUM BROMIDE 0.5 MG: 0.5 SOLUTION RESPIRATORY (INHALATION) at 08:41

## 2024-01-09 RX ADMIN — GUAIFENESIN 600 MG: 600 TABLET ORAL at 08:07

## 2024-01-09 RX ADMIN — LEVALBUTEROL HYDROCHLORIDE 1.25 MG: 1.25 SOLUTION RESPIRATORY (INHALATION) at 14:10

## 2024-01-09 RX ADMIN — AZITHROMYCIN MONOHYDRATE 500 MG: 500 INJECTION, POWDER, LYOPHILIZED, FOR SOLUTION INTRAVENOUS at 20:42

## 2024-01-09 RX ADMIN — NICOTINE 1 PATCH: 7 PATCH, EXTENDED RELEASE TRANSDERMAL at 08:06

## 2024-01-09 RX ADMIN — METOPROLOL TARTRATE 25 MG: 25 TABLET, FILM COATED ORAL at 19:56

## 2024-01-09 RX ADMIN — ESCITALOPRAM OXALATE 20 MG: 10 TABLET ORAL at 08:07

## 2024-01-09 RX ADMIN — PANTOPRAZOLE SODIUM 20 MG: 20 TABLET, DELAYED RELEASE ORAL at 08:07

## 2024-01-09 RX ADMIN — GUAIFENESIN 600 MG: 600 TABLET ORAL at 16:15

## 2024-01-09 RX ADMIN — MAGNESIUM 64 MG (MAGNESIUM CHLORIDE) TABLET,DELAYED RELEASE 64 MG: at 09:36

## 2024-01-09 RX ADMIN — HEPARIN SODIUM 5000 UNITS: 5000 INJECTION, SOLUTION INTRAVENOUS; SUBCUTANEOUS at 20:46

## 2024-01-09 RX ADMIN — Medication 2000 UNITS: at 08:07

## 2024-01-09 RX ADMIN — HEPARIN SODIUM 5000 UNITS: 5000 INJECTION, SOLUTION INTRAVENOUS; SUBCUTANEOUS at 13:18

## 2024-01-09 RX ADMIN — ASPIRIN 81 MG: 81 TABLET, COATED ORAL at 08:07

## 2024-01-09 RX ADMIN — MAGNESIUM SULFATE HEPTAHYDRATE 2 G: 40 INJECTION, SOLUTION INTRAVENOUS at 13:20

## 2024-01-09 RX ADMIN — METHYLPREDNISOLONE SODIUM SUCCINATE 40 MG: 40 INJECTION, POWDER, FOR SOLUTION INTRAMUSCULAR; INTRAVENOUS at 05:55

## 2024-01-09 RX ADMIN — LEVALBUTEROL HYDROCHLORIDE 1.25 MG: 1.25 SOLUTION RESPIRATORY (INHALATION) at 19:28

## 2024-01-09 RX ADMIN — INSULIN LISPRO 4 UNITS: 100 INJECTION, SOLUTION INTRAVENOUS; SUBCUTANEOUS at 16:16

## 2024-01-09 RX ADMIN — ATORVASTATIN CALCIUM 80 MG: 40 TABLET, FILM COATED ORAL at 20:46

## 2024-01-09 RX ADMIN — INSULIN LISPRO 8 UNITS: 100 INJECTION, SOLUTION INTRAVENOUS; SUBCUTANEOUS at 08:10

## 2024-01-09 RX ADMIN — INSULIN LISPRO 3 UNITS: 100 INJECTION, SOLUTION INTRAVENOUS; SUBCUTANEOUS at 08:10

## 2024-01-09 RX ADMIN — ACETAMINOPHEN 650 MG: 325 TABLET, FILM COATED ORAL at 08:07

## 2024-01-09 RX ADMIN — INSULIN LISPRO 8 UNITS: 100 INJECTION, SOLUTION INTRAVENOUS; SUBCUTANEOUS at 16:16

## 2024-01-09 RX ADMIN — LEVOTHYROXINE SODIUM 150 MCG: 150 TABLET ORAL at 05:55

## 2024-01-09 RX ADMIN — GABAPENTIN 100 MG: 100 CAPSULE ORAL at 16:15

## 2024-01-09 RX ADMIN — GABAPENTIN 100 MG: 100 CAPSULE ORAL at 19:56

## 2024-01-09 RX ADMIN — INSULIN LISPRO 8 UNITS: 100 INJECTION, SOLUTION INTRAVENOUS; SUBCUTANEOUS at 11:47

## 2024-01-09 RX ADMIN — METOPROLOL TARTRATE 25 MG: 25 TABLET, FILM COATED ORAL at 08:07

## 2024-01-09 RX ADMIN — INSULIN LISPRO 4 UNITS: 100 INJECTION, SOLUTION INTRAVENOUS; SUBCUTANEOUS at 20:50

## 2024-01-09 RX ADMIN — SODIUM CHLORIDE, SODIUM GLUCONATE, SODIUM ACETATE, POTASSIUM CHLORIDE, MAGNESIUM CHLORIDE, SODIUM PHOSPHATE, DIBASIC, AND POTASSIUM PHOSPHATE 75 ML/HR: .53; .5; .37; .037; .03; .012; .00082 INJECTION, SOLUTION INTRAVENOUS at 16:13

## 2024-01-09 RX ADMIN — METHYLPREDNISOLONE SODIUM SUCCINATE 40 MG: 40 INJECTION, POWDER, FOR SOLUTION INTRAMUSCULAR; INTRAVENOUS at 13:19

## 2024-01-09 NOTE — ASSESSMENT & PLAN NOTE
Follows with [facility].  History of chronic mesenteric ischemia status post celiac and SMA stents.  Bilateral internal carotid artery stenosis, left greater than right.  Continue on aspirin and Plavix

## 2024-01-09 NOTE — ASSESSMENT & PLAN NOTE
Had recent echo in November 2023.  I cannot find the results of the echocardiogram.  She follows with her PCP.  Denies any upcoming appointments with [facility].

## 2024-01-09 NOTE — ASSESSMENT & PLAN NOTE
Follows with [facility].  History of chronic mesenteric ischemia status post celiac and SMA stents.  Bilateral internal carotid artery stenosis, left greater than right.    Continue on aspirin and Plavix

## 2024-01-09 NOTE — ASSESSMENT & PLAN NOTE
Current creatinine 1.84, we only have 1 previous creatinine which is 1.14.  In her history it looks like he has a C CKD 3 stage A.  Will do IV fluids and see if her renal functions recover.

## 2024-01-09 NOTE — RESPIRATORY THERAPY NOTE
RT Protocol Note  Mirna Medina 73 y.o. female MRN: [id number]  Unit/Bed#: -01 Encounter: [id number]    Assessment    Active Problems:    Anxiety    Chronic diastolic congestive heart failure (HCC)    Coronary arteriosclerosis    Depression    Diabetes mellitus type 2, insulin dependent (HCC)    Disorder of magnesium metabolism    Marijuana use    Dyslipidemia    Essential hypertension    GERD (gastroesophageal reflux disease)    Hypothyroidism    PAOD (peripheral arterial occlusive disease) (HCC)    Tobacco dependence due to cigarettes    Urinary incontinence    Acute hypoxic respiratory failure (HCC)    RSV bronchitis    HUSAM (acute kidney injury) (HCC)    Sepsis with acute hypoxic respiratory failure without septic shock (HCC)    Nonrheumatic aortic valve stenosis    Other emphysema (HCC)      Home Pulmonary Medications:         Past Medical History:   Diagnosis Date    CHF (congestive heart failure) (HCC)     Coronary artery disease     Diabetes mellitus (HCC)     Disease of thyroid gland      Social History     Socioeconomic History    Marital status: Single     Spouse name: None    Number of children: None    Years of education: None    Highest education level: None   Occupational History    None   Tobacco Use    Smoking status: Every Day     Current packs/day: 0.25     Types: Cigarettes    Smokeless tobacco: Never   Vaping Use    Vaping status: Never Used   Substance and Sexual Activity    Alcohol use: Not Currently    Drug use: Yes     Types: Marijuana    Sexual activity: None   Other Topics Concern    None   Social History Narrative    None     Social Determinants of Health     Financial Resource Strain: Low Risk  (5/4/2023)    Received from [facility]     Overall Financial Resource Strain (CARDIA)     Difficulty of Paying Living Expenses: Not very hard   Food Insecurity: Not on file   Transportation Needs: Not on file   Physical Activity: Not on file   Stress: Not on file   Social Connections: Not  on file   Intimate Partner Violence: Not on file   Housing Stability: Unknown (5/4/2023)    Received from [facility]     Housing Stability Vital Sign     Unable to Pay for Housing in the Last Year: Not on file     Number of Places Lived in the Last Year: Not on file     Is your current living situation unsteady?   (I.e. Staying with others,  in a hotel, in a shelter, living outside: on the street, on a beach, in a car, abandoned building, bus, train station or in ...: No       Subjective         Objective    Physical Exam:   Assessment Type: During-treatment  General Appearance: Alert, Awake  Respiratory Pattern: Dyspnea with exertion, Normal  Chest Assessment: Chest expansion symmetrical  Bilateral Breath Sounds: Crackles, Coarse, Expiratory wheezes  Cough: Productive  O2 Device: 2L NC    Vitals:  Blood pressure (!) 142/114, pulse 74, temperature (!) 97.3 °F (36.3 °C), resp. rate 22, height 5' (1.524 m), weight 74.8 kg (165 lb), SpO2 97%.          Imaging and other studies: I have personally reviewed pertinent reports.      O2 Device: 2L NC     Plan    Respiratory Plan: Mild Distress pathway        Resp Comments: Pt admitted for SOB/COPD/RSV. Pt HX- COPD/CAD/recurrent bronchitis. No home O2 or Tx. Pt is a current smoker (claiming 1pk per 2 weeks). BS Mild exp wheeze/coarse /crackles.  Mild YEUNG (pt able to walk to bathroom - states she is more tired than SOB).  Prductive harsh cough. VSS/SpO2 97% on 2L NC

## 2024-01-09 NOTE — CASE MANAGEMENT
Case Management Assessment & Discharge Planning Note    Patient name Mirna Medina  Location /-01 MRN [id number]  : 1950 Date 2024       Current Admission Date: 2024  Current Admission Diagnosis:Acute hypoxic respiratory failure (HCC)   Patient Active Problem List    Diagnosis Date Noted    Disorder of magnesium metabolism 2024    Acute hypoxic respiratory failure (HCC) 2024    RSV bronchitis 2024    HUSAM (acute kidney injury) ([facility]) 2024    Sepsis with acute hypoxic respiratory failure without septic shock ([facility]) 2024    Nonrheumatic aortic valve stenosis 2024    Other emphysema ([facility]) 2024    Urinary incontinence 2023    Tobacco dependence due to cigarettes 2022    Chronic diastolic congestive heart failure ([facility]) 2020    Diabetes mellitus type 2, insulin dependent ([facility]) 2020    Coronary arteriosclerosis 2019    PAOD (peripheral arterial occlusive disease) ([facility]) 2019    GERD (gastroesophageal reflux disease) 2015    Anxiety 2012    Depression 2011    Dyslipidemia 2011    Essential hypertension 2011    Hypothyroidism 2011      LOS (days): 1  Geometric Mean LOS (GMLOS) (days): 3.6  Days to GMLOS:2.8     OBJECTIVE:    Risk of Unplanned Readmission Score: 18.09         Current admission status: Inpatient  Referral Reason: Other (dc needs)    Preferred Pharmacy:   [facility] # [facility] - [address] [address]  Phone: [phone] Fax: [phone]    Primary Care Provider: Dago Zuniga DO    Primary Insurance: MEDICARE  Secondary Insurance: AET[employer]    ASSESSMENT:  CM met with patient at the bedside,baseline information  was obtained. CM discussed the role of CM in helping the patient develop a discharge plan and assist the patient in carry out their plan.   Active Health Care Proxies    There are no active Health Care Proxies on file.        Advance Directives  Does patient have a Health Care POA?: No  Was patient offered paperwork?: No (declined)  Does patient currently have a Health Care decision maker?: Yes, please see Health Care Proxy section  Does patient have Advance Directives?: No  Was patient offered paperwork?: No (declined)  Primary Contact: Helen Oconnor (Mother)  [phone]         Readmission Root Cause  30 Day Readmission: No    Patient Information  Admitted from:: Home  Mental Status: Alert  During Assessment patient was accompanied by: Not accompanied during assessment  Assessment information provided by:: Patient  Primary Caregiver: Self  Support Systems: Self, Spouse/significant other  County of Residence: [facility]  What city do you live in?: [place]  Home entry access options. Select all that apply.: Stairs  Number of steps to enter home.: 3  Do the steps have railings?: Yes  Type of Current Residence: [facility]  Living Arrangements: Lives w/ Spouse/significant other  Is patient a ?: No    Activities of Daily Living Prior to Admission  Functional Status: Independent  Completes ADLs independently?: Yes  Ambulates independently?: Yes  Does patient use assisted devices?: No  Does patient currently own DME?: No  Does patient have a history of Outpatient Therapy (PT/OT)?: No (was going to start today however was admitted to the hospital)  Does the patient have a history of Short-Term Rehab?: No  Does patient have a history of HHC?: No  Does patient currently have HHC?: No         Patient Information Continued  Income Source: Pension/FPC  Does patient have prescription coverage?: Yes  Does patient receive dialysis treatments?: No  Does patient have a history of substance abuse?: Yes  Historical substance use preference: Marijuana  History of Withdrawal Symptoms: Denies past symptoms  Is patient currently in treatment for substance abuse?: No. Patient declined treatment information.  Does patient have a history of  Mental Health Diagnosis?: No (per chart pt has hx of mental health however pt denied during assessment)         Means of Transportation  Means of Transport to Appts:: Drives Self      Housing Stability: Low Risk  (1/9/2024)    Housing Stability Vital Sign     Unable to Pay for Housing in the Last Year: No     Number of Places Lived in the Last Year: 1     Unstable Housing in the Last Year: No   Food Insecurity: No Food Insecurity (1/9/2024)    Hunger Vital Sign     Worried About Running Out of Food in the Last Year: Never true     Ran Out of Food in the Last Year: Never true   Transportation Needs: No Transportation Needs (1/9/2024)    PRAPARE - Transportation     Lack of Transportation (Medical): No     Lack of Transportation (Non-Medical): No   Utilities: Not At Risk (1/9/2024)    [facility] Utilities     Threatened with loss of utilities: No       DISCHARGE DETAILS:  Cm met with pt to discuss dc needs. Patient indicates no needs at dc and will return home. Pt drove herself to the hospital and plans to drive herself home. Cm will follow up if dc needs change.     Discharge planning discussed with:: Patient  Freedom of Choice: Yes  Comments - Freedom of Choice: Patient indicates no dc needs  CM contacted family/caregiver?: No- see comments  Were Treatment Team discharge recommendations reviewed with patient/caregiver?: Yes  Did patient/caregiver verbalize understanding of patient care needs?: Yes  Were patient/caregiver advised of the risks associated with not following Treatment Team discharge recommendations?: Yes         Requested Home Health Care         Is the patient interested in HHC at discharge?: No    DME Referral Provided  Referral made for DME?: No    Other Referral/Resources/Interventions Provided:  Interventions: None Indicated         Treatment Team Recommendation: Home  Discharge Destination Plan:: Home

## 2024-01-09 NOTE — ASSESSMENT & PLAN NOTE
Under a lot of stress since her mother has moved in.  She does smoke marijuana for this.  Follow-up with primary care provider

## 2024-01-09 NOTE — ASSESSMENT & PLAN NOTE
With elevated WBCs and tachypnea.  Patient's prolactin level and lactic acid level are normal.  This secondary to RSV bronchitis.

## 2024-01-09 NOTE — ED NOTES
Patient placed on 2L O2 via NC for decreased O2 saturation of 87% on RA     Tracy Lombardi RN  01/08/24 [id number]

## 2024-01-09 NOTE — PLAN OF CARE
Problem: PAIN - ADULT  Goal: Verbalizes/displays adequate comfort level or baseline comfort level  Description: Interventions:  - Encourage patient to monitor pain and request assistance  - Assess pain using appropriate pain scale  - Administer analgesics based on type and severity of pain and evaluate response  - Implement non-pharmacological measures as appropriate and evaluate response  - Consider cultural and social influences on pain and pain management  - Notify physician/advanced practitioner if interventions unsuccessful or patient reports new pain  Outcome: Progressing     Problem: INFECTION - ADULT  Goal: Absence or prevention of progression during hospitalization  Description: INTERVENTIONS:  - Assess and monitor for signs and symptoms of infection  - Monitor lab/diagnostic results  - Monitor all insertion sites, i.e. indwelling lines, tubes, and drains  - Monitor endotracheal if appropriate and nasal secretions for changes in amount and color  - [place] appropriate cooling/warming therapies per order  - Administer medications as ordered  - Instruct and encourage patient and family to use good hand hygiene technique  - Identify and instruct in appropriate isolation precautions for identified infection/condition  Outcome: Progressing     Problem: SAFETY ADULT  Goal: Patient will remain free of falls  Description: INTERVENTIONS:  - Educate patient/family on patient safety including physical limitations  - Instruct patient to call for assistance with activity   - Consult OT/PT to assist with strengthening/mobility   - Keep Call bell within reach  - Keep bed low and locked with side rails adjusted as appropriate  - Keep care items and personal belongings within reach  - Initiate and maintain comfort rounds  - Make Fall Risk Sign visible to staff  - Apply yellow socks and bracelet for high fall risk patients  - Consider moving patient to room near nurses station  Outcome: Progressing  Goal: Maintain or  return to baseline ADL function  Description: INTERVENTIONS:  -  Assess patient's ability to carry out ADLs; assess patient's baseline for ADL function and identify physical deficits which impact ability to perform ADLs (bathing, care of mouth/teeth, toileting, grooming, dressing, etc.)  - Assess/evaluate cause of self-care deficits   - Assess range of motion  - Assess patient's mobility; develop plan if impaired  - Assess patient's need for assistive devices and provide as appropriate  - Encourage maximum independence but intervene and supervise when necessary  - Involve family in performance of ADLs  - Assess for home care needs following discharge   - Consider OT consult to assist with ADL evaluation and planning for discharge  - Provide patient education as appropriate  Outcome: Progressing  Goal: Maintains/Returns to pre admission functional level  Description: INTERVENTIONS:  - Perform AM-PAC 6 Click Basic Mobility/ Daily Activity assessment daily.  - Set and communicate daily mobility goal to care team and patient/family/caregiver.   - Collaborate with rehabilitation services on mobility goals if consulted  - Out of bed for toileting  - Record patient progress and toleration of activity level   Outcome: Progressing     Problem: DISCHARGE PLANNING  Goal: Discharge to home or other facility with appropriate resources  Description: INTERVENTIONS:  - Identify barriers to discharge w/patient and caregiver  - Arrange for needed discharge resources and transportation as appropriate  - Identify discharge learning needs (meds, wound care, etc.)  - Arrange for interpretive services to assist at discharge as needed  - Refer to Case Management Department for coordinating discharge planning if the patient needs post-hospital services based on physician/advanced practitioner order or complex needs related to functional status, cognitive ability, or social support system  Outcome: Progressing     Problem: Knowledge  Deficit  Goal: Patient/family/caregiver demonstrates understanding of disease process, treatment plan, medications, and discharge instructions  Description: Complete learning assessment and assess knowledge base.  Interventions:  - Provide teaching at level of understanding  - Provide teaching via preferred learning methods  Outcome: Progressing     Problem: RESPIRATORY - ADULT  Goal: Achieves optimal ventilation and oxygenation  Description: INTERVENTIONS:  - Assess for changes in respiratory status  - Assess for changes in mentation and behavior  - Position to facilitate oxygenation and minimize respiratory effort  - Oxygen administered by appropriate delivery if ordered  - Initiate smoking cessation education as indicated  - Encourage broncho-pulmonary hygiene including cough, deep breathe, Incentive Spirometry  - Assess the need for suctioning and aspirate as needed  - Assess and instruct to report SOB or any respiratory difficulty  - Respiratory Therapy support as indicated  Outcome: Progressing

## 2024-01-09 NOTE — ASSESSMENT & PLAN NOTE
With elevated WBCs and tachypnea.    Patient's prolactin level and lactic acid level are normal.    This secondary to RSV bronchitis.  resolved

## 2024-01-09 NOTE — ASSESSMENT & PLAN NOTE
Current creatinine 1.84, we only have 1 previous creatinine which is 1.14.    In her history it looks like he has a C CKD 3 stage A.    Creatinine improving with IV fluids  Continue IV fluids  Trend creatinine

## 2024-01-09 NOTE — RESPIRATORY THERAPY NOTE
RT Protocol Note  Mirna Medina 73 y.o. female MRN: [id number]  Unit/Bed#: -01 Encounter: [id number]    Assessment    Principal Problem:    Acute hypoxic respiratory failure (HCC)  Active Problems:    Anxiety    Chronic diastolic congestive heart failure (HCC)    Coronary arteriosclerosis    Depression    Diabetes mellitus type 2, insulin dependent (HCC)    Disorder of magnesium metabolism    Marijuana use    Dyslipidemia    Essential hypertension    GERD (gastroesophageal reflux disease)    Hypothyroidism    PAOD (peripheral arterial occlusive disease) (HCC)    Tobacco dependence due to cigarettes    Urinary incontinence    RSV bronchitis    HUSAM (acute kidney injury) (HCC)    Sepsis with acute hypoxic respiratory failure without septic shock (HCC)    Nonrheumatic aortic valve stenosis    Other emphysema (HCC)      Home Pulmonary Medications:         Past Medical History:   Diagnosis Date    CHF (congestive heart failure) (HCC)     Coronary artery disease     Diabetes mellitus (HCC)     Disease of thyroid gland      Social History     Socioeconomic History    Marital status: Single     Spouse name: None    Number of children: None    Years of education: None    Highest education level: None   Occupational History    None   Tobacco Use    Smoking status: Every Day     Current packs/day: 0.25     Types: Cigarettes    Smokeless tobacco: Never   Vaping Use    Vaping status: Never Used   Substance and Sexual Activity    Alcohol use: Not Currently    Drug use: Yes     Types: Marijuana    Sexual activity: None   Other Topics Concern    None   Social History Narrative    None     Social Determinants of Health     Financial Resource Strain: Low Risk  (5/4/2023)    Received from [facility]     Overall Financial Resource Strain (CARDIA)     Difficulty of Paying Living Expenses: Not very hard   Food Insecurity: Not on file   Transportation Needs: Not on file   Physical Activity: Not on file   Stress: Not on file    Social Connections: Not on file   Intimate Partner Violence: Not on file   Housing Stability: Unknown (5/4/2023)    Received from [facility]     Housing Stability Vital Sign     Unable to Pay for Housing in the Last Year: Not on file     Number of Places Lived in the Last Year: Not on file     Is your current living situation unsteady?   (I.e. Staying with others,  in a hotel, in a shelter, living outside: on the street, on a beach, in a car, abandoned building, bus, train station or in ...: No       Subjective         Objective    Physical Exam:   Assessment Type: During-treatment  General Appearance: Alert, Awake  Respiratory Pattern: Dyspnea with exertion, Normal  Chest Assessment: Chest expansion symmetrical  Bilateral Breath Sounds: Crackles, Coarse, Expiratory wheezes  Cough: Productive  O2 Device: (P) 1L    Vitals:  Blood pressure 126/73, pulse 72, temperature 97.5 °F (36.4 °C), resp. rate 18, height 5' (1.524 m), weight 74.8 kg (165 lb), SpO2 99%.          Imaging and other studies: I have personally reviewed pertinent reports.      O2 Device: (P) 1L     Plan    Respiratory Plan: Mild Distress pathway        Resp Comments: (P) Pt os stable on 1L , will titrate to off

## 2024-01-09 NOTE — ASSESSMENT & PLAN NOTE
Followed by [facility] endocrinology.  She is currently on Lantus 50 units nightly and NovoLog 12 units with meals plus correction scale.  Last A1c, October 17, 2023 which was 8.4  (P) [id number]

## 2024-01-09 NOTE — ASSESSMENT & PLAN NOTE
The patient does not wear oxygen at home.  Required 2L nc on admission  Steroids, nebs  Wean O2 as tolerated, down to 1L

## 2024-01-09 NOTE — ASSESSMENT & PLAN NOTE
June 2022, she had a stress test that showed EF of 46%.  Continue Plavix and Lopressor.    Her ACE inhibitor is being held secondary to HUSAM.  She is not on a diuretic.      Wt Readings from Last 3 Encounters:   01/08/24 74.8 kg (165 lb)   01/03/24 74.8 kg (165 lb)   12/31/23 72.6 kg (160 lb)

## 2024-01-09 NOTE — ASSESSMENT & PLAN NOTE
Had recent echo in November 2023.  Unable to find the results of the echocardiogram.  She follows with her PCP.

## 2024-01-09 NOTE — ASSESSMENT & PLAN NOTE
June 2022, she had a stress test that showed EF of 46%.  Continue Plavix and Lopressor.  Her ACE inhibitor got held secondary to HUSAM.  She is not on a diuretic.      Wt Readings from Last 3 Encounters:   01/08/24 74.8 kg (165 lb)   01/03/24 74.8 kg (165 lb)   12/31/23 72.6 kg (160 lb)

## 2024-01-09 NOTE — QUICK NOTE
Addended by: HAVEN DOMÍNGUEZ on: 5/30/2018 10:18 AM     Modules accepted: Orders     Pt developed mid sternal chest discomfort, denies other s/s. Noted to be anxious about being discharged too soon. Admits to not feeling that she is ready to go home since she has a sore throat and it tired still. EKG was done and provider aware. VSS. No further orders were given.

## 2024-01-09 NOTE — ASSESSMENT & PLAN NOTE
Patient is not on any nebulizers or inhalers.  Back in 2020 she did have a CAT scan that showed mild emphysema.  She does not wear oxygen at home.  Patient continues smoke including marijuana.  CT Chest shows Mild emphysema   Outpatient follow up

## 2024-01-09 NOTE — ASSESSMENT & PLAN NOTE
RSV is positive.  Will do IV steroids.  Supportive care  At home she has been doing Mucinex and Vicks and other OTCs without getting better

## 2024-01-09 NOTE — ASSESSMENT & PLAN NOTE
Patient is not on any nebulizers or inhalers.  Back in 2020 she did have a CAT scan that showed mild emphysema.  She does not wear oxygen at home.  Patient continues smoke including marijuana.  Will obtain CT of the chest for evaluation of her lungs

## 2024-01-09 NOTE — ASSESSMENT & PLAN NOTE
Followed by [facility] endocrinology.  She is currently on Lantus 50 units nightly and NovoLog 12 units with meals plus correction scale.  Last A1c, October 17, 2023 which was 8.4

## 2024-01-09 NOTE — PLAN OF CARE
Problem: SAFETY ADULT  Goal: Maintain or return to baseline ADL function  Description: INTERVENTIONS:  -  Assess patient's ability to carry out ADLs; assess patient's baseline for ADL function and identify physical deficits which impact ability to perform ADLs (bathing, care of mouth/teeth, toileting, grooming, dressing, etc.)  - Assess/evaluate cause of self-care deficits   - Assess range of motion  - Assess patient's mobility; develop plan if impaired  - Assess patient's need for assistive devices and provide as appropriate  - Encourage maximum independence but intervene and supervise when necessary  - Involve family in performance of ADLs  - Assess for home care needs following discharge   - Consider OT consult to assist with ADL evaluation and planning for discharge  - Provide patient education as appropriate  Outcome: Progressing     Problem: DISCHARGE PLANNING  Goal: Discharge to home or other facility with appropriate resources  Description: INTERVENTIONS:  - Identify barriers to discharge w/patient and caregiver  - Arrange for needed discharge resources and transportation as appropriate  - Identify discharge learning needs (meds, wound care, etc.)  - Arrange for interpretive services to assist at discharge as needed  - Refer to Case Management Department for coordinating discharge planning if the patient needs post-hospital services based on physician/advanced practitioner order or complex needs related to functional status, cognitive ability, or social support system  Outcome: Progressing     Problem: Knowledge Deficit  Goal: Patient/family/caregiver demonstrates understanding of disease process, treatment plan, medications, and discharge instructions  Description: Complete learning assessment and assess knowledge base.  Interventions:  - Provide teaching at level of understanding  - Provide teaching via preferred learning methods  Outcome: Progressing

## 2024-01-09 NOTE — PROGRESS NOTES
[facility]  Progress Note  Name: Mirna Medina I  MRN: [id number]  Unit/Bed#: -01 I Date of Admission: 1/8/2024   Date of Service: 1/9/2024 I Hospital Day: 1    Assessment/Plan   * Acute hypoxic respiratory failure (HCC)  Assessment & Plan  The patient does not wear oxygen at home.  Required 2L nc on admission  Steroids, nebs  Wean O2 as tolerated, down to 1L      Other emphysema (HCC)  Assessment & Plan  Patient is not on any nebulizers or inhalers.  Back in 2020 she did have a CAT scan that showed mild emphysema.  She does not wear oxygen at home.  Patient continues smoke including marijuana.  CT Chest shows Mild emphysema   Outpatient follow up    RSV bronchitis  Assessment & Plan  RSV is positive.    IV steroids.    Supportive care    Sepsis with acute hypoxic respiratory failure without septic shock ([facility])  Assessment & Plan  With elevated WBCs and tachypnea.    Patient's prolactin level and lactic acid level are normal.    This secondary to RSV bronchitis.  resolved    Nonrheumatic aortic valve stenosis  Assessment & Plan  Had recent echo in November 2023.  Unable to find the results of the echocardiogram.  She follows with her PCP.      HUSAM (acute kidney injury) ([facility])  Assessment & Plan  Current creatinine 1.84, we only have 1 previous creatinine which is 1.14.    In her history it looks like he has a C CKD 3 stage A.    Creatinine improving with IV fluids  Continue IV fluids  Trend creatinine    Urinary incontinence  Assessment & Plan  Continue finasteride    Tobacco dependence due to cigarettes  Assessment & Plan  Has been smoking for a while, she used to be a .  She states a pack will last her 2 weeks.  NRT    PAOD (peripheral arterial occlusive disease) ([facility])  Assessment & Plan  Follows with [place] [facility].  History of chronic mesenteric ischemia status post celiac and SMA stents.  Bilateral internal carotid artery stenosis, left greater than right.     Continue on aspirin and Plavix      Hypothyroidism  Assessment & Plan  Continue levothyroxine    GERD (gastroesophageal reflux disease)  Assessment & Plan  Continue Protonix    Essential hypertension  Assessment & Plan  Continue Lopressor.   Continue to hold lisinopril    Dyslipidemia  Assessment & Plan  Continue statin,  Fenofibrate and omega-3 held    Disorder of magnesium metabolism  Assessment & Plan  Unclear, but she is on 2 magnesium supplements  Mag 1.8, given 2 g mag sulfate    Diabetes mellitus type 2, insulin dependent (HCC)  Assessment & Plan  Followed by [facility] endocrinology.  She is currently on Lantus 50 units nightly and NovoLog 12 units with meals plus correction scale.  Last A1c, October 17, 2023 which was 8.4  (P) [id number]      Depression  Assessment & Plan  Continue Lexapro    Coronary arteriosclerosis  Assessment & Plan  Continue with Plavix and aspirin.    Chronic diastolic congestive heart failure (HCC)  Assessment & Plan  June 2022, she had a stress test that showed EF of 46%.  Continue Plavix and Lopressor.    Her ACE inhibitor is being held secondary to HUSAM.  She is not on a diuretic.      Wt Readings from Last 3 Encounters:   01/08/24 74.8 kg (165 lb)   01/03/24 74.8 kg (165 lb)   12/31/23 72.6 kg (160 lb)               Anxiety  Assessment & Plan  Under a lot of stress since her mother has moved in.  She does smoke marijuana for this.  Follow-up with primary care provider             VTE Pharmacologic Prophylaxis: VTE Score: 8 High Risk (Score >/= 5) - Pharmacological DVT Prophylaxis Ordered: heparin. Sequential Compression Devices Ordered.    Mobility:   Basic Mobility Inpatient Raw Score: 22  JH-HLM Goal: 7: Walk 25 feet or more  JH-HLM Achieved: 7: Walk 25 feet or more  HLM Goal achieved. Continue to encourage appropriate mobility.    Patient Centered Rounds: I performed bedside rounds with nursing staff today.   Discussions with Specialists or Other Care Team  Provider: none      Total Time Spent on Date of Encounter in care of patient: 36 mins. This time was spent on one or more of the following: performing physical exam; counseling and coordination of care; obtaining or reviewing history; documenting in the medical record; reviewing/ordering tests, medications or procedures; communicating with other healthcare professionals and discussing with patient's family/caregivers.    Current Length of Stay: 1 day(s)  Current Patient Status: Inpatient   Certification Statement: The patient will continue to require additional inpatient hospital stay due to resp fail, flu  Discharge Plan: Anticipate discharge tomorrow to home.    Code Status: Level 3 - DNAR and DNI    Subjective:   Patient is to feel very symptomatic, coughing with chest congestion not bringing up much, her breathing definitely feels better than admission but still far from baseline.  Also some generalized malaise and muscle aching    Objective:     Vitals:   Temp (24hrs), Av.5 °F (36.4 °C), Min:97.3 °F (36.3 °C), Max:98.1 °F (36.7 °C)    Temp:  [97.3 °F (36.3 °C)-98.1 °F (36.7 °C)] 97.5 °F (36.4 °C)  HR:  [69-80] 72  Resp:  [18-26] 18  BP: (102-178)/() 126/73  SpO2:  [87 %-99 %] 99 %  Body mass index is 32.22 kg/m².     Input and Output Summary (last 24 hours):     Intake/Output Summary (Last 24 hours) at 2024 1215  Last data filed at 2024 0900  Gross per 24 hour   Intake 180 ml   Output --   Net 180 ml       Physical Exam:   Physical Exam  Vitals and nursing note reviewed.   Constitutional:       General: She is not in acute distress.     Appearance: She is well-developed.   HENT:      Head: Normocephalic and atraumatic.   Eyes:      Conjunctiva/sclera: Conjunctivae normal.   Cardiovascular:      Rate and Rhythm: Normal rate and regular rhythm.      Heart sounds: No murmur heard.  Pulmonary:      Effort: Pulmonary effort is normal. No respiratory distress.      Comments: Reduced bilaterally,  scattered rhonchi, faint end expiratory wheeze  Abdominal:      Palpations: Abdomen is soft.      Tenderness: There is no abdominal tenderness.   Musculoskeletal:         General: No swelling.      Cervical back: Neck supple.   Skin:     General: Skin is warm and dry.      Capillary Refill: Capillary refill takes less than 2 seconds.   Neurological:      Mental Status: She is alert.   Psychiatric:         Mood and Affect: Mood normal.          Additional Data:     Labs:  Results from last 7 days   Lab Units 01/09/24  0524 01/08/24  1605   WBC Thousand/uL 11.54* 14.80*   HEMOGLOBIN g/dL 14.0 16.9*   HEMATOCRIT % 42.8 50.7*   PLATELETS Thousands/uL 222 308   NEUTROS PCT %  --  66   LYMPHS PCT %  --  27   MONOS PCT %  --  6   EOS PCT %  --  1     Results from last 7 days   Lab Units 01/09/24  0524 01/08/24  1605   SODIUM mmol/L 134* 136   POTASSIUM mmol/L 4.9 4.2   CHLORIDE mmol/L 108 105   CO2 mmol/L 19* 21   BUN mg/dL 50* 56*   CREATININE mg/dL 1.45* 1.84*   ANION GAP mmol/L 7 10   CALCIUM mg/dL 9.1 9.7   ALBUMIN g/dL  --  4.4   TOTAL BILIRUBIN mg/dL  --  0.43   ALK PHOS U/L  --  83   ALT U/L  --  16   AST U/L  --  22   GLUCOSE RANDOM mg/dL 265* 146*     Results from last 7 days   Lab Units 01/08/24  1605   INR  0.94     Results from last 7 days   Lab Units 01/09/24  1102 01/09/24  0722 01/08/24  2100   POC GLUCOSE mg/dl 301* 243* 273*         Results from last 7 days   Lab Units 01/08/24  1605   LACTIC ACID mmol/L 1.8   PROCALCITONIN ng/ml 0.13       Lines/Drains:  Invasive Devices       Peripheral Intravenous Line  Duration             Peripheral IV 01/08/24 Right Antecubital <1 day                          Imaging: No pertinent imaging reviewed.    Recent Cultures (last 7 days):   Results from last 7 days   Lab Units 01/08/24  1633 01/08/24  1605   BLOOD CULTURE  Received in Microbiology Lab. Culture in Progress. Received in Microbiology Lab. Culture in Progress.       Last 24 Hours Medication List:   Current  Facility-Administered Medications   Medication Dose Route Frequency Provider Last Rate    acetaminophen  650 mg Oral Q6H PRN Colt Mustafa MD      aspirin  81 mg Oral Daily Colt Mustafa MD      atorvastatin  80 mg Oral HS Colt Mustafa MD      azithromycin  500 mg Intravenous Q24H Colt Mustafa  mg (01/08/24 2127)    cholecalciferol  2,000 Units Oral Daily Colt Mustafa MD      clopidogrel  75 mg Oral Daily Colt Mustafa MD      escitalopram  20 mg Oral Daily Colt Mustafa MD      finasteride  5 mg Oral Daily Colt Mustafa MD      gabapentin  100 mg Oral TID Colt Mustafa MD      guaiFENesin  600 mg Oral BID Colt Mustafa MD      heparin (porcine)  5,000 Units Subcutaneous Q8H RACHELE Colt Mustafa MD      insulin glargine  40 Units Subcutaneous HS Colt Mustafa MD      insulin lispro  1-6 Units Subcutaneous 4x Daily (PC & HS) Colt Mustafa MD      insulin lispro  8 Units Subcutaneous TID With Meals Colt Mustafa MD      ipratropium  0.5 mg Nebulization TID Colt Mustafa MD      levalbuterol  1.25 mg Nebulization TID Colt Mustafa MD      levothyroxine  150 mcg Oral Daily Colt Mustafa MD      magnesium chloride  64 mg Oral Daily Colt Mustafa MD      magnesium sulfate  2 g Intravenous Once Kameron Burns DO      methylPREDNISolone sodium succinate  40 mg Intravenous Q8H Colt Mustafa MD      metoprolol tartrate  25 mg Oral BID Colt Mustafa MD      multi-electrolyte  75 mL/hr Intravenous Continuous Colt Mustafa MD 75 mL/hr (01/08/24 2025)    nicotine  1 patch Transdermal Daily Colt Mustafa MD      oxyCODONE  5 mg Oral Q6H PRN Colt Mustafa MD      pantoprazole  20 mg Oral Early Morning Colt Mustafa MD      traMADol  50 mg Oral Q8H PRN Colt Mustafa MD          Today, Patient Was Seen By: Kameron Burns DO    **Please Note:  This note may have been constructed using a voice recognition system.**

## 2024-01-09 NOTE — H&P
[facility]  H&P  Name: Mirna Medina 73 y.o. female I MRN: [id number]  Unit/Bed#: -01 I Date of Admission: 1/8/2024   Date of Service: 1/8/2024 I Hospital Day: 0      Assessment/Plan   Acute hypoxic respiratory failure (HCC)  Assessment & Plan  With a room air pulse sat of 87%.  On 2 L oxygen.  The patient does not wear oxygen at home.    RSV bronchitis  Assessment & Plan  RSV is positive.  Will do IV steroids.  Supportive care  At home she has been doing Mucinex and Vicks and other OTCs without getting better    Other emphysema (HCC)  Assessment & Plan  Patient is not on any nebulizers or inhalers.  Back in 2020 she did have a CAT scan that showed mild emphysema.  She does not wear oxygen at home.  Patient continues smoke including marijuana.  Will obtain CT of the chest for evaluation of her lungs    HUSAM (acute kidney injury) (HCC)  Assessment & Plan  Current creatinine 1.84, we only have 1 previous creatinine which is 1.14.  In her history it looks like he has a C CKD 3 stage A.  Will do IV fluids and see if her renal functions recover.    Sepsis with acute hypoxic respiratory failure without septic shock (HCC)  Assessment & Plan  With elevated WBCs and tachypnea.  Patient's prolactin level and lactic acid level are normal.  This secondary to RSV bronchitis.    Chronic diastolic congestive heart failure (HCC)  Assessment & Plan  June 2022, she had a stress test that showed EF of 46%.  Continue Plavix and Lopressor.  Her ACE inhibitor got held secondary to HUSAM.  She is not on a diuretic.      Wt Readings from Last 3 Encounters:   01/08/24 74.8 kg (165 lb)   01/03/24 74.8 kg (165 lb)   12/31/23 72.6 kg (160 lb)       Diabetes mellitus type 2, insulin dependent (HCC)  Assessment & Plan  Followed by [facility] endocrinology.  She is currently on Lantus 50 units nightly and NovoLog 12 units with meals plus correction scale.  Last A1c, October 17, 2023 which was 8.4    Placed on a  lower insulin regimen due to her not eating.  Placed on Lantus 40 units at bedtime and insulin 8 units with meals.  She will also be on a sliding scale        Essential hypertension  Assessment & Plan  Continue Lopressor.  I have held her lisinopril due to HUSAM    PAOD (peripheral arterial occlusive disease) ([facility])  Assessment & Plan  Follows with [place] vascular surgery.  History of chronic mesenteric ischemia status post celiac and SMA stents.  Bilateral internal carotid artery stenosis, left greater than right.  Continue on aspirin and Plavix      Nonrheumatic aortic valve stenosis  Assessment & Plan  Had recent echo in November 2023.  I cannot find the results of the echocardiogram.  She follows with her PCP.  Denies any upcoming appointments with [facility] cardiology.    Dyslipidemia  Assessment & Plan  Continue statin,  Fenofibrate and omega-3 held    Coronary arteriosclerosis  Assessment & Plan  Continue with Plavix and aspirin.    Urinary incontinence  Assessment & Plan  Continue finasteride    Disorder of magnesium metabolism  Assessment & Plan  Unclear, but she is on 2 magnesium supplements    GERD (gastroesophageal reflux disease)  Assessment & Plan  Continue Protonix    Anxiety  Assessment & Plan  Under a lot of stress since her mother has moved in.  She does smoke marijuana for this.    Depression  Assessment & Plan  Continue Lexapro    Tobacco dependence due to cigarettes  Assessment & Plan  Has been smoking for a while, she used to be a .  She states a pack will last her 2 weeks.    Marijuana use  Assessment & Plan  Uses for anxiety.  She is under a lot of stress as her mother has moved in with her.         Disposition  #1 IV steroids, IV azithromycin and breathing treatments  #2 CT of the chest  #3 repeat labs in the morning  #4 titrate oxygen down if able          VTE Prophylaxis: Heparin  / sequential compression device   Code Status: Level 3 - DNAR and DNI       Anticipated Length of Stay:   Patient will be admitted on an Inpatient basis with an anticipated length of stay of greater than 2 midnights.   Justification for Hospital Stay: Please see detailed plans noted above.    Chief Complaint:     Shortness of breath and cough  History of Present Illness:  Mirna Medina is a 73 y.o. female who has past medical history significant for hypertension, CAD, aortic valve stenosis, tobacco abuse, marijuana use, anxiety, depression, diabetes mellitus type 2 on insulin, acid reflux, who presents with 5 days of cough congestion not get any better with over-the-counter medications.  She states that she has like mucus stuck in her chest.  Her coughing is worse at nighttime.  She presented to the hospital and she was hypoxic with a pulse ox of 87%.  She to be placed on 2 L.  Patient does smoke cigarettes and she states a pack will last her 2 weeks.  She also smokes marijuana.  She is at increased stress in her life as her mom has moved in with her.  She also mentions that she has not been eating well due to the fact she is sick.      Review of Systems:    Constitutional:  Denies fever or chills   Eyes:  Denies change in visual acuity   HENT:  Denies nasal congestion or sore throat   Respiratory: Cough and shortness of breath  Cardiovascular:  Denies chest pain or edema   GI: Decreased appetite  :  Denies dysuria   Musculoskeletal:  Denies back pain or joint pain   Integument:  Denies rash   Neurologic:  Denies headache or sensory changes   Endocrine:  Denies polyuria or polydipsia   Lymphatic:  Denies swollen glands   Psychiatric:  Denies depression or anxiety     Past Medical and Surgical History:   Past Medical History:   Diagnosis Date    CHF (congestive heart failure) (HCC)     Coronary artery disease     Diabetes mellitus (HCC)     Disease of thyroid gland      Past Surgical History:   Procedure Laterality Date    APPENDECTOMY      CARDIAC SURGERY      CORONARY ANGIOPLASTY WITH STENT PLACEMENT      HERNIA  REPAIR         Meds/Allergies:  Medications Prior to Admission   Medication Sig Dispense Refill Last Dose    aspirin (ECOTRIN LOW STRENGTH) 81 mg EC tablet Take 81 mg by mouth daily   1/8/2024    atorvastatin (LIPITOR) 80 mg tablet Take 80 mg by mouth daily at bedtime   1/7/2024    Cholecalciferol 50 MCG (2000 UT) CAPS Take 2,000 Units by mouth daily   1/8/2024    clopidogrel (PLAVIX) 75 mg tablet Take 75 mg by mouth daily   1/8/2024    escitalopram (LEXAPRO) 20 mg tablet Take 20 mg by mouth daily   1/8/2024    fenofibrate (TRICOR) 48 mg tablet    1/8/2024    finasteride (PROSCAR) 5 mg tablet Take 5 mg by mouth daily   1/8/2024    gabapentin (NEURONTIN) 100 mg capsule Take 100 mg by mouth 3 (three) times a day   1/8/2024    insulin aspart (NovoLOG FlexPen) 100 UNIT/ML injection pen 3 (three) times a day   1/8/2024    insulin glargine (LANTUS SOLOSTAR) 100 units/mL injection pen Inject 48 Units under the skin daily at bedtime   1/8/2024    lidocaine (Lidoderm) 5 % Apply 1 patch topically over 12 hours daily for 15 days Remove & Discard patch within 12 hours or as directed by MD 15 patch 0 Past Week    lisinopril (ZESTRIL) 20 mg tablet Take 20 mg by mouth daily   1/8/2024    magnesium 30 MG tablet Take 71.5 mg by mouth 2 (two) times a day 71.5mg OTC - magnesium chloride (slow Mag)   1/8/2024    Magnesium Oxide 500 MG TABS Take 1,000 mg by mouth 3 (three) times a day   1/8/2024    metoprolol tartrate (LOPRESSOR) 25 mg tablet Take 25 mg by mouth 2 (two) times a day   1/8/2024    omeprazole (PriLOSEC OTC) 20 MG tablet Take 20 mg by mouth daily   1/8/2024    oxyCODONE-acetaminophen (Percocet) 5-325 mg per tablet Take 1 tablet by mouth every 4 (four) hours as needed for moderate pain for up to 10 doses Max Daily Amount: 6 tablets 10 tablet 0 1/7/2024    traMADol (Ultram) 50 mg tablet Take 1 tablet (50 mg total) by mouth every 6 (six) hours as needed for moderate pain 20 tablet 0 1/7/2024    Empagliflozin (JARDIANCE) 10 MG  TABS tablet Take 10 mg by mouth daily (Patient not taking: Reported on 1/8/2024)   Not Taking    levothyroxine 125 mcg tablet Take 150 mcg by mouth daily       Omega-3 Fatty Acids (fish oil) 1,000 mg Take 2 g by mouth 2 (two) times a day          Allergies:   Allergies   Allergen Reactions    Bupropion GI Intolerance    Varenicline Other (See Comments)     No appetite, nauseated, no energy    Ibuprofen Other (See Comments)     Does not combine with medicine she takes    Liraglutide GI Intolerance    Medical Tape Other (See Comments)     itchy and red    Wound Dressing Adhesive Itching    Keflex [Cephalexin] Rash    Latex Rash       History:  Marital Status: Single     Substance Use History:   Social History     Substance and Sexual Activity   Alcohol Use Not Currently     Social History     Tobacco Use   Smoking Status Every Day    Current packs/day: 0.25    Types: Cigarettes   Smokeless Tobacco Never     Social History     Substance and Sexual Activity   Drug Use Yes    Types: Marijuana       Family History:  History reviewed. No pertinent family history.    Physical Exam:     Vitals:   Blood Pressure: 102/77 (01/08/24 2010)  Pulse: 76 (01/08/24 2010)  Temperature: (!) 97.3 °F (36.3 °C) (01/08/24 2010)  Temp Source: Temporal (01/08/24 1553)  Respirations: 19 (01/08/24 1930)  Height: 5' (152.4 cm) (01/08/24 1552)  Weight - Scale: 74.8 kg (165 lb) (01/08/24 1552)  SpO2: 95 % (01/08/24 2010)    Constitutional:  Non-toxic appearance  Eyes:  EOMI, No scleral icterus   HENT:   oropharynx moist, external ears normal, external nose normal   Respiratory: Decreased breath sounds with rhonchi  Cardiovascular:  Normal rate, murmur  GI:  Soft, nondistended, no guarding   :  No costovertebral angle tenderness   Musculoskeletal:  no tenderness, no deformities.   Integument:  no jaundice, no rash   Neurologic:  Alert &awake, communicative, CN 2-12 normal,  no focal deficits noted   Psychiatric:  Speech and behavior appropriate        Lab Results: I have personally reviewed pertinent reports.      Results from last 7 days   Lab Units 01/08/24  1605   WBC Thousand/uL 14.80*   HEMOGLOBIN g/dL 16.9*   HEMATOCRIT % 50.7*   PLATELETS Thousands/uL 308   NEUTROS PCT % 66   LYMPHS PCT % 27   MONOS PCT % 6   EOS PCT % 1     Results from last 7 days   Lab Units 01/08/24  1605   POTASSIUM mmol/L 4.2   CHLORIDE mmol/L 105   CO2 mmol/L 21   BUN mg/dL 56*   CREATININE mg/dL 1.84*   CALCIUM mg/dL 9.7   ALK PHOS U/L 83   ALT U/L 16   AST U/L 22     Results from last 7 days   Lab Units 01/08/24  1605   INR  0.94         Imaging: I have personally reviewed pertinent reports.   and I have personally reviewed pertinent films in PACS    XR spine lumbar 2 or 3 views injury    Result Date: 1/3/2024  Narrative: LUMBAR SPINE INDICATION:   Other symptoms and signs involving the nervous system. COMPARISON:  Comparison made with previous examination(s) dated (CT) 05-Jul-2021. VIEWS:  XR SPINE LUMBAR 2 OR 3 VIEWS INJURY Images: 3 FINDINGS: There are 5 non rib bearing lumbar vertebral bodies. There is no evidence of acute fracture or destructive osseous lesion. Mild scoliotic deformity is noted.  Alignment is otherwise unremarkable. Multilevel lumbar disc space narrowing with vacuum disc phenomenon, endplate degenerative change and lower lumbar facet arthropathy. The pedicles appear intact. There are atherosclerotic calcifications. Soft tissues are otherwise unremarkable.     Impression: No acute osseous abnormality.  Degenerative changes as described. Electronically signed: 01/03/2024 04:38 PM Florencio Martinez MPH,MD    XR hip/pelv 2-3 vws left if performed    Result Date: 1/1/2024  Narrative: LEFT HIP INDICATION:   M25.552: Pain in left hip. COMPARISON:  None VIEWS:  XR HIP/PELV 2-3 VWS LEFT  W PELVIS IF PERFORMED FINDINGS: There is no acute fracture or dislocation. Mild bilateral hip degenerative changes. No lytic or blastic osseous lesion. Small peripheral pelvic  calcification(s) which may represent calcified phleboliths. Atherosclerotic calcifications are present. Hernia repair mesh anchors are noted. Degenerative changes pubic symphysis and visualized lower lumbar spine.     Impression: No acute osseous abnormality. Workstation performed: [id number]       Total time for visit, including counseling/coordination of care: 70 minutes. Greater than 50% of this total time spent on direct patient counseling and coorination of care.  Reviewed records from [facility].  Discussed previous CT scan findings with the patient.  She was unaware of emphysema diagnoses.    Epic Records Reviewed as well as Records in Care Everywhere    ** Please Note: Dragon 360 Dictation voice to text software was used in the creation of this document. **

## 2024-01-10 PROBLEM — J44.1 COPD EXACERBATION (HCC): Status: ACTIVE | Noted: 2024-01-08

## 2024-01-10 LAB
ANION GAP SERPL CALCULATED.3IONS-SCNC: 8 MMOL/L
ATRIAL RATE: 77 BPM
ATRIAL RATE: 82 BPM
BUN SERPL-MCNC: 42 MG/DL (ref 5–25)
CALCIUM SERPL-MCNC: 9.5 MG/DL (ref 8.4–10.2)
CHLORIDE SERPL-SCNC: 107 MMOL/L (ref 96–108)
CO2 SERPL-SCNC: 21 MMOL/L (ref 21–32)
CREAT SERPL-MCNC: 1.16 MG/DL (ref 0.6–1.3)
ERYTHROCYTE [DISTWIDTH] IN BLOOD BY AUTOMATED COUNT: 13.8 % (ref 11.6–15.1)
GFR SERPL CREATININE-BSD FRML MDRD: 47 ML/MIN/1.73SQ M
GLUCOSE SERPL-MCNC: 188 MG/DL (ref 65–140)
GLUCOSE SERPL-MCNC: 194 MG/DL (ref 65–140)
GLUCOSE SERPL-MCNC: 206 MG/DL (ref 65–140)
GLUCOSE SERPL-MCNC: 207 MG/DL (ref 65–140)
GLUCOSE SERPL-MCNC: 234 MG/DL (ref 65–140)
HCT VFR BLD AUTO: 44.9 % (ref 34.8–46.1)
HGB BLD-MCNC: 14.7 G/DL (ref 11.5–15.4)
MAGNESIUM SERPL-MCNC: 2.1 MG/DL (ref 1.9–2.7)
MCH RBC QN AUTO: 30.6 PG (ref 26.8–34.3)
MCHC RBC AUTO-ENTMCNC: 32.7 G/DL (ref 31.4–37.4)
MCV RBC AUTO: 94 FL (ref 82–98)
P AXIS: 69 DEGREES
P AXIS: 76 DEGREES
PLATELET # BLD AUTO: 249 THOUSANDS/UL (ref 149–390)
PMV BLD AUTO: 11.2 FL (ref 8.9–12.7)
POTASSIUM SERPL-SCNC: 4.6 MMOL/L (ref 3.5–5.3)
PR INTERVAL: 162 MS
PR INTERVAL: 176 MS
QRS AXIS: -12 DEGREES
QRS AXIS: -31 DEGREES
QRSD INTERVAL: 80 MS
QRSD INTERVAL: 80 MS
QT INTERVAL: 374 MS
QT INTERVAL: 374 MS
QTC INTERVAL: 423 MS
QTC INTERVAL: 436 MS
RBC # BLD AUTO: 4.8 MILLION/UL (ref 3.81–5.12)
SODIUM SERPL-SCNC: 136 MMOL/L (ref 135–147)
T WAVE AXIS: 118 DEGREES
T WAVE AXIS: 141 DEGREES
VENTRICULAR RATE: 77 BPM
VENTRICULAR RATE: 82 BPM
WBC # BLD AUTO: 18.44 THOUSAND/UL (ref 4.31–10.16)

## 2024-01-10 PROCEDURE — 94760 N-INVAS EAR/PLS OXIMETRY 1: CPT

## 2024-01-10 PROCEDURE — 80048 BASIC METABOLIC PNL TOTAL CA: CPT | Performed by: HOSPITALIST

## 2024-01-10 PROCEDURE — 82948 REAGENT STRIP/BLOOD GLUCOSE: CPT

## 2024-01-10 PROCEDURE — 85027 COMPLETE CBC AUTOMATED: CPT | Performed by: HOSPITALIST

## 2024-01-10 PROCEDURE — 83735 ASSAY OF MAGNESIUM: CPT | Performed by: FAMILY MEDICINE

## 2024-01-10 PROCEDURE — 94640 AIRWAY INHALATION TREATMENT: CPT

## 2024-01-10 PROCEDURE — 99232 SBSQ HOSP IP/OBS MODERATE 35: CPT | Performed by: FAMILY MEDICINE

## 2024-01-10 PROCEDURE — 94664 DEMO&/EVAL PT USE INHALER: CPT

## 2024-01-10 RX ORDER — METHYLPREDNISOLONE SODIUM SUCCINATE 40 MG/ML
40 INJECTION, POWDER, LYOPHILIZED, FOR SOLUTION INTRAMUSCULAR; INTRAVENOUS EVERY 12 HOURS SCHEDULED
Status: DISCONTINUED | OUTPATIENT
Start: 2024-01-10 | End: 2024-01-11

## 2024-01-10 RX ADMIN — INSULIN LISPRO 8 UNITS: 100 INJECTION, SOLUTION INTRAVENOUS; SUBCUTANEOUS at 07:56

## 2024-01-10 RX ADMIN — IPRATROPIUM BROMIDE 0.5 MG: 0.5 SOLUTION RESPIRATORY (INHALATION) at 13:36

## 2024-01-10 RX ADMIN — IPRATROPIUM BROMIDE 0.5 MG: 0.5 SOLUTION RESPIRATORY (INHALATION) at 08:05

## 2024-01-10 RX ADMIN — CLOPIDOGREL 75 MG: 75 TABLET ORAL at 08:01

## 2024-01-10 RX ADMIN — NICOTINE 1 PATCH: 7 PATCH, EXTENDED RELEASE TRANSDERMAL at 08:05

## 2024-01-10 RX ADMIN — LEVALBUTEROL HYDROCHLORIDE 1.25 MG: 1.25 SOLUTION RESPIRATORY (INHALATION) at 20:56

## 2024-01-10 RX ADMIN — METOPROLOL TARTRATE 25 MG: 25 TABLET, FILM COATED ORAL at 08:01

## 2024-01-10 RX ADMIN — LEVOTHYROXINE SODIUM 150 MCG: 150 TABLET ORAL at 04:46

## 2024-01-10 RX ADMIN — METHYLPREDNISOLONE SODIUM SUCCINATE 40 MG: 40 INJECTION, POWDER, FOR SOLUTION INTRAMUSCULAR; INTRAVENOUS at 13:38

## 2024-01-10 RX ADMIN — PANTOPRAZOLE SODIUM 20 MG: 20 TABLET, DELAYED RELEASE ORAL at 07:58

## 2024-01-10 RX ADMIN — FINASTERIDE 5 MG: 5 TABLET, FILM COATED ORAL at 08:00

## 2024-01-10 RX ADMIN — GABAPENTIN 100 MG: 100 CAPSULE ORAL at 21:32

## 2024-01-10 RX ADMIN — AZITHROMYCIN MONOHYDRATE 500 MG: 500 INJECTION, POWDER, LYOPHILIZED, FOR SOLUTION INTRAVENOUS at 22:03

## 2024-01-10 RX ADMIN — INSULIN LISPRO 2 UNITS: 100 INJECTION, SOLUTION INTRAVENOUS; SUBCUTANEOUS at 07:59

## 2024-01-10 RX ADMIN — LEVALBUTEROL HYDROCHLORIDE 1.25 MG: 1.25 SOLUTION RESPIRATORY (INHALATION) at 08:05

## 2024-01-10 RX ADMIN — METHYLPREDNISOLONE SODIUM SUCCINATE 40 MG: 40 INJECTION, POWDER, FOR SOLUTION INTRAMUSCULAR; INTRAVENOUS at 04:45

## 2024-01-10 RX ADMIN — ESCITALOPRAM OXALATE 20 MG: 10 TABLET ORAL at 08:00

## 2024-01-10 RX ADMIN — METHYLPREDNISOLONE SODIUM SUCCINATE 40 MG: 40 INJECTION, POWDER, FOR SOLUTION INTRAMUSCULAR; INTRAVENOUS at 22:06

## 2024-01-10 RX ADMIN — LEVALBUTEROL HYDROCHLORIDE 1.25 MG: 1.25 SOLUTION RESPIRATORY (INHALATION) at 13:36

## 2024-01-10 RX ADMIN — MAGNESIUM 64 MG (MAGNESIUM CHLORIDE) TABLET,DELAYED RELEASE 64 MG: at 09:13

## 2024-01-10 RX ADMIN — INSULIN LISPRO 8 UNITS: 100 INJECTION, SOLUTION INTRAVENOUS; SUBCUTANEOUS at 16:30

## 2024-01-10 RX ADMIN — Medication 2000 UNITS: at 08:00

## 2024-01-10 RX ADMIN — HEPARIN SODIUM 5000 UNITS: 5000 INJECTION, SOLUTION INTRAVENOUS; SUBCUTANEOUS at 13:38

## 2024-01-10 RX ADMIN — GUAIFENESIN 600 MG: 600 TABLET ORAL at 17:06

## 2024-01-10 RX ADMIN — GABAPENTIN 100 MG: 100 CAPSULE ORAL at 08:00

## 2024-01-10 RX ADMIN — HEPARIN SODIUM 5000 UNITS: 5000 INJECTION, SOLUTION INTRAVENOUS; SUBCUTANEOUS at 22:05

## 2024-01-10 RX ADMIN — METOPROLOL TARTRATE 25 MG: 25 TABLET, FILM COATED ORAL at 21:35

## 2024-01-10 RX ADMIN — ASPIRIN 81 MG: 81 TABLET, COATED ORAL at 08:00

## 2024-01-10 RX ADMIN — INSULIN LISPRO 1 UNITS: 100 INJECTION, SOLUTION INTRAVENOUS; SUBCUTANEOUS at 11:37

## 2024-01-10 RX ADMIN — HEPARIN SODIUM 5000 UNITS: 5000 INJECTION, SOLUTION INTRAVENOUS; SUBCUTANEOUS at 04:45

## 2024-01-10 RX ADMIN — ATORVASTATIN CALCIUM 80 MG: 40 TABLET, FILM COATED ORAL at 21:32

## 2024-01-10 RX ADMIN — INSULIN LISPRO 8 UNITS: 100 INJECTION, SOLUTION INTRAVENOUS; SUBCUTANEOUS at 11:09

## 2024-01-10 RX ADMIN — INSULIN LISPRO 2 UNITS: 100 INJECTION, SOLUTION INTRAVENOUS; SUBCUTANEOUS at 16:30

## 2024-01-10 RX ADMIN — INSULIN LISPRO 2 UNITS: 100 INJECTION, SOLUTION INTRAVENOUS; SUBCUTANEOUS at 22:14

## 2024-01-10 RX ADMIN — GUAIFENESIN 600 MG: 600 TABLET ORAL at 08:01

## 2024-01-10 RX ADMIN — GABAPENTIN 100 MG: 100 CAPSULE ORAL at 16:29

## 2024-01-10 RX ADMIN — IPRATROPIUM BROMIDE 0.5 MG: 0.5 SOLUTION RESPIRATORY (INHALATION) at 20:56

## 2024-01-10 RX ADMIN — INSULIN GLARGINE 40 UNITS: 100 INJECTION, SOLUTION SUBCUTANEOUS at 22:14

## 2024-01-10 NOTE — ASSESSMENT & PLAN NOTE
Follows with [facility].  History of chronic mesenteric ischemia status post celiac and SMA stents.  Bilateral internal carotid artery stenosis, left greater than right.    Continue on aspirin, Plavix and statin

## 2024-01-10 NOTE — ASSESSMENT & PLAN NOTE
Followed by [facility] endocrinology.  She is currently on Lantus 50 units nightly and NovoLog 12 units with meals plus correction scale.  Last A1c, October 17, 2023 which was 8.4  (P) [id number] average blood glucose

## 2024-01-10 NOTE — PLAN OF CARE
Problem: INFECTION - ADULT  Goal: Absence or prevention of progression during hospitalization  Description: INTERVENTIONS:  - Assess and monitor for signs and symptoms of infection  - Monitor lab/diagnostic results  - Monitor all insertion sites, i.e. indwelling lines, tubes, and drains  - Monitor endotracheal if appropriate and nasal secretions for changes in amount and color  - Camilla appropriate cooling/warming therapies per order  - Administer medications as ordered  - Instruct and encourage patient and family to use good hand hygiene technique  - Identify and instruct in appropriate isolation precautions for identified infection/condition  Outcome: Progressing

## 2024-01-10 NOTE — ASSESSMENT & PLAN NOTE
Under a lot of stress since her mother has moved in. She admits to ongoing marijuana use to help herself cope with stress  Follow-up with primary care provider

## 2024-01-10 NOTE — PLAN OF CARE
Problem: PAIN - ADULT  Goal: Verbalizes/displays adequate comfort level or baseline comfort level  Description: Interventions:  - Encourage patient to monitor pain and request assistance  - Assess pain using appropriate pain scale  - Administer analgesics based on type and severity of pain and evaluate response  - Implement non-pharmacological measures as appropriate and evaluate response  - Consider cultural and social influences on pain and pain management  - Notify physician/advanced practitioner if interventions unsuccessful or patient reports new pain  Outcome: Progressing     Problem: INFECTION - ADULT  Goal: Absence or prevention of progression during hospitalization  Description: INTERVENTIONS:  - Assess and monitor for signs and symptoms of infection  - Monitor lab/diagnostic results  - Monitor all insertion sites, i.e. indwelling lines, tubes, and drains  - Monitor endotracheal if appropriate and nasal secretions for changes in amount and color  - [place] appropriate cooling/warming therapies per order  - Administer medications as ordered  - Instruct and encourage patient and family to use good hand hygiene technique  - Identify and instruct in appropriate isolation precautions for identified infection/condition  Outcome: Progressing     Problem: SAFETY ADULT  Goal: Patient will remain free of falls  Description: INTERVENTIONS:  - Educate patient/family on patient safety including physical limitations  - Instruct patient to call for assistance with activity   - Consult OT/PT to assist with strengthening/mobility   - Keep Call bell within reach  - Keep bed low and locked with side rails adjusted as appropriate  - Keep care items and personal belongings within reach  - Initiate and maintain comfort rounds  - Make Fall Risk Sign visible to staff  - Offer Toileting every 2 Hours, in advance of need  - Initiate/Maintain   alarm  - Obtain necessary fall risk management equipment:   - Apply yellow socks and  bracelet for high fall risk patients  - Consider moving patient to room near nurses station  Outcome: Progressing  Goal: Maintain or return to baseline ADL function  Description: INTERVENTIONS:  -  Assess patient's ability to carry out ADLs; assess patient's baseline for ADL function and identify physical deficits which impact ability to perform ADLs (bathing, care of mouth/teeth, toileting, grooming, dressing, etc.)  - Assess/evaluate cause of self-care deficits   - Assess range of motion  - Assess patient's mobility; develop plan if impaired  - Assess patient's need for assistive devices and provide as appropriate  - Encourage maximum independence but intervene and supervise when necessary  - Involve family in performance of ADLs  - Assess for home care needs following discharge   - Consider OT consult to assist with ADL evaluation and planning for discharge  - Provide patient education as appropriate  Outcome: Progressing  Goal: Maintains/Returns to pre admission functional level  Description: INTERVENTIONS:  - Perform AM-PAC 6 Click Basic Mobility/ Daily Activity assessment daily.  - Set and communicate daily mobility goal to care team and patient/family/caregiver.   - Collaborate with rehabilitation services on mobility goals if consulted  - Perform Range of Motion 3 times a day.  - Reposition patient every 2 hours.  - Dangle patient 3 times a day  - Stand patient 3 times a day  - Ambulate patient 3 times a day  - Out of bed to chair 3 times a day   - Out of bed for meals 3 times a day  - Out of bed for toileting  - Record patient progress and toleration of activity level   Outcome: Progressing     Problem: DISCHARGE PLANNING  Goal: Discharge to home or other facility with appropriate resources  Description: INTERVENTIONS:  - Identify barriers to discharge w/patient and caregiver  - Arrange for needed discharge resources and transportation as appropriate  - Identify discharge learning needs (meds, wound care,  etc.)  - Arrange for interpretive services to assist at discharge as needed  - Refer to Case Management Department for coordinating discharge planning if the patient needs post-hospital services based on physician/advanced practitioner order or complex needs related to functional status, cognitive ability, or social support system  Outcome: Progressing     Problem: Knowledge Deficit  Goal: Patient/family/caregiver demonstrates understanding of disease process, treatment plan, medications, and discharge instructions  Description: Complete learning assessment and assess knowledge base.  Interventions:  - Provide teaching at level of understanding  - Provide teaching via preferred learning methods  Outcome: Progressing     Problem: RESPIRATORY - ADULT  Goal: Achieves optimal ventilation and oxygenation  Description: INTERVENTIONS:  - Assess for changes in respiratory status  - Assess for changes in mentation and behavior  - Position to facilitate oxygenation and minimize respiratory effort  - Oxygen administered by appropriate delivery if ordered  - Initiate smoking cessation education as indicated  - Encourage broncho-pulmonary hygiene including cough, deep breathe, Incentive Spirometry  - Assess the need for suctioning and aspirate as needed  - Assess and instruct to report SOB or any respiratory difficulty  - Respiratory Therapy support as indicated  Outcome: Progressing

## 2024-01-10 NOTE — ASSESSMENT & PLAN NOTE
Unclear, but she is on 2 magnesium supplements  Mag 1.8, given 2 g mag sulfate - normalized with replacement

## 2024-01-10 NOTE — PROGRESS NOTES
[facility]  Progress Note  Name: Mirna Medina I  MRN: [id number]  Unit/Bed#: -01 I Date of Admission: 1/8/2024   Date of Service: 1/10/2024 I Hospital Day: 2    Assessment/Plan   * Acute hypoxic respiratory failure (HCC)  Assessment & Plan  The patient does not wear oxygen at home.  Required 2L NC on admission  Secondary to RSV bronchitis and COPD exacerbation  Steroids, nebs  Wean O2 as tolerated, down to 1L  Hopeful discharge in the next 24 hours      RSV bronchitis  Assessment & Plan  RSV is positive.    Decrease Solu-medrol to 40 mg IV BID - from TID  Supportive care    Sepsis with acute hypoxic respiratory failure without septic shock (HCC)  Assessment & Plan  With elevated WBCs and tachypnea.  Viral sepsis secondary to RSV infection.  Patient's prolacitonin level and lactic acid level are normal.    Secondary to RSV bronchitis.  Resolved    COPD exacerbation (HCC)  Assessment & Plan  Patient is not on any nebulizers or inhalers prior to admission.  Back in 2020 she did have a CAT scan that showed mild emphysema.  She does not wear oxygen at home.  Patient continues to smoke including marijuana.  CT Chest shows Mild emphysema   In the exacerbation due to RSV infection, receiving IV steroids, scheduled nebulizers  Outpatient follow up with pulmonology for PFTs recommended    Nonrheumatic aortic valve stenosis  Assessment & Plan  Appears euvolemic  Outpatient follow up      HUSAM (acute kidney injury) ([facility])  Assessment & Plan  Current creatinine 1.84, we only have 1 previous creatinine which is 1.14.    In her history it looks like he has a C CKD 3 stage A.    Creatinine improving with IV fluids  Discontinue further IVF    Urinary incontinence  Assessment & Plan  Continue finasteride    Tobacco dependence due to cigarettes  Assessment & Plan  Reports smoking 1 pack per 2 weeks  Smoking cessation counseling  NRT    PAOD (peripheral arterial occlusive disease) ([facility])  Assessment &  Plan  Follows with [place] [facility].  History of chronic mesenteric ischemia status post celiac and SMA stents.  Bilateral internal carotid artery stenosis, left greater than right.    Continue on aspirin, Plavix and statin      Hypothyroidism  Assessment & Plan  Continue levothyroxine    GERD (gastroesophageal reflux disease)  Assessment & Plan  Continue Protonix    Essential hypertension  Assessment & Plan  Continue Lopressor.   Lisinopril on hold    Dyslipidemia  Assessment & Plan  Continue statin  Fenofibrate and omega-3 held for now    Disorder of magnesium metabolism  Assessment & Plan  Unclear, but she is on 2 magnesium supplements  Mag 1.8, given 2 g mag sulfate - normalized with replacement    Diabetes mellitus type 2, insulin dependent (HCC)  Assessment & Plan  Followed by [facility] endocrinology.  She is currently on Lantus 50 units nightly and NovoLog 12 units with meals plus correction scale.  Last A1c, October 17, 2023 which was 8.4  (P) 255.3712624228966052 average blood glucose      Depression  Assessment & Plan  Continue Lexapro    Coronary arteriosclerosis  Assessment & Plan  Continue with Plavix and aspirin.    Chronic diastolic congestive heart failure (HCC)  Assessment & Plan  June 2022, she had a stress test that showed EF of 46%.  Continue Plavix and Lopressor.    Her ACE inhibitor is being held secondary to HUSAM.  She is not on a diuretic.      Wt Readings from Last 3 Encounters:   01/08/24 74.8 kg (165 lb)   01/03/24 74.8 kg (165 lb)   12/31/23 72.6 kg (160 lb)               Anxiety  Assessment & Plan  Under a lot of stress since her mother has moved in. She admits to ongoing marijuana use to help herself cope with stress  Follow-up with primary care provider               VTE Pharmacologic Prophylaxis: VTE Score: 8 High Risk (Score >/= 5) - Pharmacological DVT Prophylaxis Ordered: heparin. Sequential Compression Devices Ordered.    Mobility:   Basic Mobility Inpatient Raw Score:  22  JH-HLM Goal: 7: Walk 25 feet or more  JH-HLM Achieved: 8: Walk 250 feet ot more  HLM Goal achieved. Continue to encourage appropriate mobility.    Patient Centered Rounds: I performed bedside rounds with nursing staff today.   Discussions with Specialists or Other Care Team Provider: Multidisciplinary meeting    Education and Discussions with Family / Patient:  patient.     Total Time Spent on Date of Encounter in care of patient: 50 mins. This time was spent on one or more of the following: performing physical exam; counseling and coordination of care; obtaining or reviewing history; documenting in the medical record; reviewing/ordering tests, medications or procedures; communicating with other healthcare professionals and discussing with patient's family/caregivers.    Current Length of Stay: 2 day(s)  Current Patient Status: Inpatient   Certification Statement: The patient will continue to require additional inpatient hospital stay due to close monitoring, respiratory support  Discharge Plan: Anticipate discharge in 24-48 hrs to home.    Code Status: Level 3 - DNAR and DNI    Subjective:   Patient states that her cough and breathing have improved, however she continues to feel very weak.  No acute events overnight.    Objective:     Vitals:   Temp (24hrs), Av.5 °F (36.4 °C), Min:96.8 °F (36 °C), Max:97.9 °F (36.6 °C)    Temp:  [96.8 °F (36 °C)-97.9 °F (36.6 °C)] 97.7 °F (36.5 °C)  HR:  [67-84] 67  Resp:  [18] 18  BP: (147-156)/(62-66) 156/66  SpO2:  [88 %-94 %] 94 %  Body mass index is 32.22 kg/m².     Input and Output Summary (last 24 hours):     Intake/Output Summary (Last 24 hours) at 1/10/2024 1531  Last data filed at 1/10/2024 1300  Gross per 24 hour   Intake 2925 ml   Output 575 ml   Net 2350 ml       Physical Exam:   Physical Exam  Constitutional:       General: She is not in acute distress.  HENT:      Head: Normocephalic and atraumatic.      Nose: No congestion.   Eyes:      Conjunctiva/sclera:  Conjunctivae normal.   Cardiovascular:      Rate and Rhythm: Normal rate and regular rhythm.      Heart sounds: No murmur heard.  Pulmonary:      Effort: No respiratory distress.      Breath sounds: Wheezing present. No rales.   Abdominal:      General: There is no distension.      Tenderness: There is no abdominal tenderness. There is no guarding.   Musculoskeletal:      Right lower leg: No edema.      Left lower leg: No edema.   Skin:     General: Skin is warm and dry.   Neurological:      Mental Status: She is oriented to person, place, and time.          Additional Data:     Labs:  Results from last 7 days   Lab Units 01/10/24  0452 01/09/24  0524 01/08/24  1605   WBC Thousand/uL 18.44*   < > 14.80*   HEMOGLOBIN g/dL 14.7   < > 16.9*   HEMATOCRIT % 44.9   < > 50.7*   PLATELETS Thousands/uL 249   < > 308   NEUTROS PCT %  --   --  66   LYMPHS PCT %  --   --  27   MONOS PCT %  --   --  6   EOS PCT %  --   --  1    < > = values in this interval not displayed.     Results from last 7 days   Lab Units 01/10/24  0452 01/09/24  0524 01/08/24  1605   SODIUM mmol/L 136   < > 136   POTASSIUM mmol/L 4.6   < > 4.2   CHLORIDE mmol/L 107   < > 105   CO2 mmol/L 21   < > 21   BUN mg/dL 42*   < > 56*   CREATININE mg/dL 1.16   < > 1.84*   ANION GAP mmol/L 8   < > 10   CALCIUM mg/dL 9.5   < > 9.7   ALBUMIN g/dL  --   --  4.4   TOTAL BILIRUBIN mg/dL  --   --  0.43   ALK PHOS U/L  --   --  83   ALT U/L  --   --  16   AST U/L  --   --  22   GLUCOSE RANDOM mg/dL 234*   < > 146*    < > = values in this interval not displayed.     Results from last 7 days   Lab Units 01/08/24  1605   INR  0.94     Results from last 7 days   Lab Units 01/10/24  1051 01/10/24  0719 01/09/24  2048 01/09/24  1547 01/09/24  1102 01/09/24  0722 01/08/24  2100   POC GLUCOSE mg/dl 188* 207* 277* 298* 301* 243* 273*         Results from last 7 days   Lab Units 01/08/24  1605   LACTIC ACID mmol/L 1.8   PROCALCITONIN ng/ml 0.13       Lines/Drains:  Invasive Devices        Peripheral Intravenous Line  Duration             Peripheral IV 01/09/24 Right;Upper;Ventral (anterior) Arm <1 day                        Imaging: no new    Recent Cultures (last 7 days):   Results from last 7 days   Lab Units 01/08/24  1633 01/08/24  1605   BLOOD CULTURE  No Growth at 24 hrs. No Growth at 24 hrs.       Last 24 Hours Medication List:   Current Facility-Administered Medications   Medication Dose Route Frequency Provider Last Rate    acetaminophen  650 mg Oral Q6H PRN Colt Mustafa MD      aspirin  81 mg Oral Daily Colt Mustafa MD      atorvastatin  80 mg Oral HS Colt Mustafa MD      azithromycin  500 mg Intravenous Q24H Colt Mustafa  mg (01/09/24 2042)    cholecalciferol  2,000 Units Oral Daily Colt Mustafa MD      clopidogrel  75 mg Oral Daily Colt Mustafa MD      escitalopram  20 mg Oral Daily Colt Mustafa MD      finasteride  5 mg Oral Daily Colt Mustafa MD      gabapentin  100 mg Oral TID Colt Mustafa MD      guaiFENesin  600 mg Oral BID Colt Mustafa MD      heparin (porcine)  5,000 Units Subcutaneous Q8H RACHELE Colt Mustafa MD      insulin glargine  40 Units Subcutaneous HS Colt Mustafa MD      insulin lispro  1-6 Units Subcutaneous 4x Daily (PC & HS) Colt Mustafa MD      insulin lispro  8 Units Subcutaneous TID With Meals Colt Mustafa MD      ipratropium  0.5 mg Nebulization TID Colt Mustafa MD      levalbuterol  1.25 mg Nebulization TID Colt Mustafa MD      levothyroxine  150 mcg Oral Daily Colt Mustafa MD      magnesium chloride  64 mg Oral Daily Colt Mustafa MD      methylPREDNISolone sodium succinate  40 mg Intravenous Q12H [facility] MD      metoprolol tartrate  25 mg Oral BID Colt Mustafa MD      nicotine  1 patch Transdermal Daily Colt Mustafa MD      oxyCODONE  5 mg Oral Q6H PRN Colt Mustafa MD       pantoprazole  20 mg Oral Early Morning Colt Mustafa MD      traMADol  50 mg Oral Q8H PRN Colt Mustafa MD          Today, Patient Was Seen By: Mary Benson MD    **Please Note: This note may have been constructed using a voice recognition system.**

## 2024-01-10 NOTE — ASSESSMENT & PLAN NOTE
Patient is not on any nebulizers or inhalers prior to admission.  Back in 2020 she did have a CAT scan that showed mild emphysema.  She does not wear oxygen at home.  Patient continues to smoke including marijuana.  CT Chest shows Mild emphysema   In the exacerbation due to RSV infection, receiving IV steroids, scheduled nebulizers  Outpatient follow up with pulmonology for PFTs recommended

## 2024-01-10 NOTE — ASSESSMENT & PLAN NOTE
The patient does not wear oxygen at home.  Required 2L NC on admission  Secondary to RSV bronchitis and COPD exacerbation  Steroids, nebs  Wean O2 as tolerated, down to 1L  Hopeful discharge in the next 24 hours

## 2024-01-10 NOTE — ASSESSMENT & PLAN NOTE
With elevated WBCs and tachypnea.  Viral sepsis secondary to RSV infection.  Patient's prolacitonin level and lactic acid level are normal.    Secondary to RSV bronchitis.  Resolved

## 2024-01-10 NOTE — ASSESSMENT & PLAN NOTE
Current creatinine 1.84, we only have 1 previous creatinine which is 1.14.    In her history it looks like he has a C CKD 3 stage A.    Creatinine improving with IV fluids  Discontinue further IVF

## 2024-01-11 VITALS
TEMPERATURE: 97.3 F | WEIGHT: 165 LBS | BODY MASS INDEX: 32.39 KG/M2 | OXYGEN SATURATION: 94 % | HEART RATE: 71 BPM | HEIGHT: 60 IN | SYSTOLIC BLOOD PRESSURE: 178 MMHG | DIASTOLIC BLOOD PRESSURE: 73 MMHG | RESPIRATION RATE: 18 BRPM

## 2024-01-11 LAB
ANION GAP SERPL CALCULATED.3IONS-SCNC: 7 MMOL/L
BASOPHILS # BLD AUTO: 0.02 THOUSANDS/ÂΜL (ref 0–0.1)
BASOPHILS NFR BLD AUTO: 0 % (ref 0–1)
BUN SERPL-MCNC: 43 MG/DL (ref 5–25)
CALCIUM SERPL-MCNC: 9.6 MG/DL (ref 8.4–10.2)
CHLORIDE SERPL-SCNC: 105 MMOL/L (ref 96–108)
CO2 SERPL-SCNC: 23 MMOL/L (ref 21–32)
CREAT SERPL-MCNC: 1.19 MG/DL (ref 0.6–1.3)
DME PARACHUTE DELIVERY DATE REQUESTED: NORMAL
DME PARACHUTE ITEM DESCRIPTION: NORMAL
DME PARACHUTE ORDER STATUS: NORMAL
DME PARACHUTE SUPPLIER NAME: NORMAL
DME PARACHUTE SUPPLIER PHONE: NORMAL
EOSINOPHIL # BLD AUTO: 0 THOUSAND/ÂΜL (ref 0–0.61)
EOSINOPHIL NFR BLD AUTO: 0 % (ref 0–6)
ERYTHROCYTE [DISTWIDTH] IN BLOOD BY AUTOMATED COUNT: 13.6 % (ref 11.6–15.1)
GFR SERPL CREATININE-BSD FRML MDRD: 45 ML/MIN/1.73SQ M
GLUCOSE SERPL-MCNC: 210 MG/DL (ref 65–140)
GLUCOSE SERPL-MCNC: 217 MG/DL (ref 65–140)
GLUCOSE SERPL-MCNC: 248 MG/DL (ref 65–140)
HCT VFR BLD AUTO: 44.4 % (ref 34.8–46.1)
HGB BLD-MCNC: 14.5 G/DL (ref 11.5–15.4)
IMM GRANULOCYTES # BLD AUTO: 0.07 THOUSAND/UL (ref 0–0.2)
IMM GRANULOCYTES NFR BLD AUTO: 0 % (ref 0–2)
LYMPHOCYTES # BLD AUTO: 1.73 THOUSANDS/ÂΜL (ref 0.6–4.47)
LYMPHOCYTES NFR BLD AUTO: 11 % (ref 14–44)
MCH RBC QN AUTO: 30.6 PG (ref 26.8–34.3)
MCHC RBC AUTO-ENTMCNC: 32.7 G/DL (ref 31.4–37.4)
MCV RBC AUTO: 94 FL (ref 82–98)
MONOCYTES # BLD AUTO: 0.53 THOUSAND/ÂΜL (ref 0.17–1.22)
MONOCYTES NFR BLD AUTO: 3 % (ref 4–12)
NEUTROPHILS # BLD AUTO: 13.56 THOUSANDS/ÂΜL (ref 1.85–7.62)
NEUTS SEG NFR BLD AUTO: 86 % (ref 43–75)
NRBC BLD AUTO-RTO: 0 /100 WBCS
PLATELET # BLD AUTO: 269 THOUSANDS/UL (ref 149–390)
PMV BLD AUTO: 11.1 FL (ref 8.9–12.7)
POTASSIUM SERPL-SCNC: 4.6 MMOL/L (ref 3.5–5.3)
RBC # BLD AUTO: 4.74 MILLION/UL (ref 3.81–5.12)
SODIUM SERPL-SCNC: 135 MMOL/L (ref 135–147)
WBC # BLD AUTO: 15.91 THOUSAND/UL (ref 4.31–10.16)

## 2024-01-11 PROCEDURE — 82948 REAGENT STRIP/BLOOD GLUCOSE: CPT

## 2024-01-11 PROCEDURE — 94664 DEMO&/EVAL PT USE INHALER: CPT

## 2024-01-11 PROCEDURE — 94760 N-INVAS EAR/PLS OXIMETRY 1: CPT

## 2024-01-11 PROCEDURE — 94640 AIRWAY INHALATION TREATMENT: CPT

## 2024-01-11 PROCEDURE — 85025 COMPLETE CBC W/AUTO DIFF WBC: CPT | Performed by: FAMILY MEDICINE

## 2024-01-11 PROCEDURE — 94761 N-INVAS EAR/PLS OXIMETRY MLT: CPT

## 2024-01-11 PROCEDURE — 99239 HOSP IP/OBS DSCHRG MGMT >30: CPT | Performed by: INTERNAL MEDICINE

## 2024-01-11 PROCEDURE — 80048 BASIC METABOLIC PNL TOTAL CA: CPT | Performed by: FAMILY MEDICINE

## 2024-01-11 RX ORDER — ACETAMINOPHEN 325 MG/1
650 TABLET ORAL EVERY 6 HOURS PRN
Qty: 30 TABLET | Refills: 0 | Status: SHIPPED | OUTPATIENT
Start: 2024-01-11

## 2024-01-11 RX ORDER — GUAIFENESIN 600 MG/1
600 TABLET, EXTENDED RELEASE ORAL 2 TIMES DAILY
Qty: 30 TABLET | Refills: 0 | Status: SHIPPED | OUTPATIENT
Start: 2024-01-11 | End: 2024-01-25

## 2024-01-11 RX ORDER — DOXYCYCLINE HYCLATE 100 MG/1
100 TABLET, DELAYED RELEASE ORAL 2 TIMES DAILY
Qty: 10 TABLET | Refills: 0 | Status: SHIPPED | OUTPATIENT
Start: 2024-01-11 | End: 2024-01-16

## 2024-01-11 RX ORDER — DIPHENHYDRAMINE HCL 25 MG
25 TABLET ORAL EVERY 6 HOURS PRN
Status: DISCONTINUED | OUTPATIENT
Start: 2024-01-11 | End: 2024-01-11 | Stop reason: HOSPADM

## 2024-01-11 RX ORDER — LEVALBUTEROL INHALATION SOLUTION 1.25 MG/3ML
1.25 SOLUTION RESPIRATORY (INHALATION)
Qty: 150 ML | Refills: 0 | Status: SHIPPED | OUTPATIENT
Start: 2024-01-11

## 2024-01-11 RX ORDER — PREDNISONE 10 MG/1
50 TABLET ORAL DAILY
Qty: 5 TABLET | Refills: 0 | Status: SHIPPED | OUTPATIENT
Start: 2024-01-12

## 2024-01-11 RX ORDER — DIPHENHYDRAMINE HCL 25 MG
25 TABLET ORAL EVERY 6 HOURS PRN
Qty: 30 TABLET | Refills: 0 | Status: SHIPPED | OUTPATIENT
Start: 2024-01-11

## 2024-01-11 RX ADMIN — Medication 2000 UNITS: at 08:34

## 2024-01-11 RX ADMIN — ASPIRIN 81 MG: 81 TABLET, COATED ORAL at 08:34

## 2024-01-11 RX ADMIN — INSULIN LISPRO 3 UNITS: 100 INJECTION, SOLUTION INTRAVENOUS; SUBCUTANEOUS at 11:32

## 2024-01-11 RX ADMIN — PANTOPRAZOLE SODIUM 20 MG: 20 TABLET, DELAYED RELEASE ORAL at 08:34

## 2024-01-11 RX ADMIN — INSULIN LISPRO 8 UNITS: 100 INJECTION, SOLUTION INTRAVENOUS; SUBCUTANEOUS at 08:32

## 2024-01-11 RX ADMIN — GABAPENTIN 100 MG: 100 CAPSULE ORAL at 08:34

## 2024-01-11 RX ADMIN — METHYLPREDNISOLONE SODIUM SUCCINATE 40 MG: 40 INJECTION, POWDER, FOR SOLUTION INTRAMUSCULAR; INTRAVENOUS at 08:33

## 2024-01-11 RX ADMIN — GUAIFENESIN 600 MG: 600 TABLET ORAL at 08:34

## 2024-01-11 RX ADMIN — INSULIN LISPRO 8 UNITS: 100 INJECTION, SOLUTION INTRAVENOUS; SUBCUTANEOUS at 11:32

## 2024-01-11 RX ADMIN — DIPHENHYDRAMINE HYDROCHLORIDE 25 MG: 25 TABLET ORAL at 09:29

## 2024-01-11 RX ADMIN — LEVOTHYROXINE SODIUM 150 MCG: 150 TABLET ORAL at 05:59

## 2024-01-11 RX ADMIN — FINASTERIDE 5 MG: 5 TABLET, FILM COATED ORAL at 08:34

## 2024-01-11 RX ADMIN — METOPROLOL TARTRATE 25 MG: 25 TABLET, FILM COATED ORAL at 08:34

## 2024-01-11 RX ADMIN — INSULIN LISPRO 2 UNITS: 100 INJECTION, SOLUTION INTRAVENOUS; SUBCUTANEOUS at 08:33

## 2024-01-11 RX ADMIN — LEVALBUTEROL HYDROCHLORIDE 1.25 MG: 1.25 SOLUTION RESPIRATORY (INHALATION) at 13:47

## 2024-01-11 RX ADMIN — IPRATROPIUM BROMIDE 0.5 MG: 0.5 SOLUTION RESPIRATORY (INHALATION) at 13:47

## 2024-01-11 RX ADMIN — IPRATROPIUM BROMIDE 0.5 MG: 0.5 SOLUTION RESPIRATORY (INHALATION) at 07:50

## 2024-01-11 RX ADMIN — HEPARIN SODIUM 5000 UNITS: 5000 INJECTION, SOLUTION INTRAVENOUS; SUBCUTANEOUS at 05:59

## 2024-01-11 RX ADMIN — ESCITALOPRAM OXALATE 20 MG: 10 TABLET ORAL at 08:34

## 2024-01-11 RX ADMIN — NICOTINE 1 PATCH: 7 PATCH, EXTENDED RELEASE TRANSDERMAL at 08:36

## 2024-01-11 RX ADMIN — CLOPIDOGREL 75 MG: 75 TABLET ORAL at 08:34

## 2024-01-11 RX ADMIN — MAGNESIUM 64 MG (MAGNESIUM CHLORIDE) TABLET,DELAYED RELEASE 64 MG: at 08:34

## 2024-01-11 RX ADMIN — LEVALBUTEROL HYDROCHLORIDE 1.25 MG: 1.25 SOLUTION RESPIRATORY (INHALATION) at 07:50

## 2024-01-11 NOTE — ASSESSMENT & PLAN NOTE
Followed by [facility] endocrinology.  Resume insulin at previous dose, since she will be on very short course of steroids no increase as been made given the patient previous history of hypoglycemia at home  (P) [id number] average blood glucose

## 2024-01-11 NOTE — DISCHARGE SUMMARY
[facility]  Discharge- Mirna Medina 1950, 73 y.o. female MRN: [id number]  Unit/Bed#: MS [place] Encounter: [id number]  Primary Care Provider: Dago Zuniga DO   Date and time admitted to hospital: 1/8/2024  3:47 PM    COPD exacerbation ([facility])  Assessment & Plan  Patient is not on any nebulizers or inhalers prior to admission.  Back in 2020 she did have a CAT scan that showed mild emphysema.  She does not wear oxygen at home.  Patient continues to smoke including marijuana.  CT Chest shows Mild emphysema   In the exacerbation due to RSV infection, receiving IV steroids, scheduled nebulizers  Outpatient follow up with pulmonology for PFTs recommended in 4 to 6 weeks, to be discussed furthermore with PCP    Resolved    Nonrheumatic aortic valve stenosis  Assessment & Plan  Appears euvolemic  Outpatient follow up      Sepsis with acute hypoxic respiratory failure without septic shock ([facility])  Assessment & Plan  With elevated WBCs and tachypnea.  Viral sepsis secondary to RSV infection.  Patient's prolacitonin level and lactic acid level are normal.    Secondary to RSV bronchitis.  Resolved    HUSAM (acute kidney injury) ([facility])  Assessment & Plan  Current creatinine 1.84, we only have 1 previous creatinine which is 1.14.    In her history it looks like he has a C CKD 3 stage A.    Resolved    RSV bronchitis  Assessment & Plan  RSV is positive.    Please refer to principal plan    Urinary incontinence  Assessment & Plan  Continue finasteride    Tobacco dependence due to cigarettes  Assessment & Plan  Reports smoking 1 pack per 2 weeks  Smoking cessation counseling  NRT    PAOD (peripheral arterial occlusive disease) ([facility])  Assessment & Plan  Follows with [place] [facility].  History of chronic mesenteric ischemia status post celiac and SMA stents.  Bilateral internal carotid artery stenosis, left greater than right.    Continue on aspirin, Plavix and statin      Hypothyroidism  Assessment &  Plan  Continue levothyroxine    GERD (gastroesophageal reflux disease)  Assessment & Plan  Continue Protonix    Essential hypertension  Assessment & Plan  Continue medication as per home dose    Dyslipidemia  Assessment & Plan  Continue home medications    Disorder of magnesium metabolism  Assessment & Plan  Unclear, but she is on 2 magnesium supplements  Continue the patient follow-up with PCP    Diabetes mellitus type 2, insulin dependent (HCC)  Assessment & Plan  Followed by [facility] endocrinology.  Resume insulin at previous dose, since she will be on very short course of steroids no increase as been made given the patient previous history of hypoglycemia at home  (P) 240.0735989186482653 average blood glucose      Depression  Assessment & Plan  Continue Lexapro    Coronary arteriosclerosis  Assessment & Plan  Continue with Plavix and aspirin.    Chronic diastolic congestive heart failure (HCC)  Assessment & Plan  June 2022, she had a stress test that showed EF of 46%.  Continue Plavix and Lopressor.    Her ACE inhibitor is being held secondary to HUSAM.----- blood pressure is increasing, okay to resume upon discharge>  She is not on a diuretic.      Wt Readings from Last 3 Encounters:   01/08/24 74.8 kg (165 lb)   01/03/24 74.8 kg (165 lb)   12/31/23 72.6 kg (160 lb)               Anxiety  Assessment & Plan  Under a lot of stress since her mother has moved in. She admits to ongoing marijuana use to help herself cope with stress  Follow-up with primary care provider    * Acute hypoxic respiratory failure (HCC)  Assessment & Plan  The patient does not wear oxygen at home.  Required 2L NC on admission  Secondary to RSV bronchitis and COPD exacerbation  Steroids, nebs  Wean O2 as tolerated, down to 1L  Hopeful discharge in the next 24 hours    Resolved, continue prednisone 50 mg/day starting tomorrow for 5 days desaturation assessment completed  Continue prednisone 50 mg/day for 5 days starting tomorrow  Given  history of COPD with current active smoking will provide 5 days of doxycycline for anti-inflammatory effect and given COPD exacerbation upon admission  Medically stable for discharge today                  Medical Problems       Resolved Problems  Date Reviewed: 1/11/2024   None         Admission Date:   Admission Orders (From admission, onward)       Ordered        01/08/24 1857  INPATIENT ADMISSION  Once                            Admitting Diagnosis: Cough [R05.9]  Hypoxemia [R09.02]  Chest pain [R07.9]  SOB (shortness of breath) [R06.02]  COPD exacerbation (HCC) [J44.1]  RSV infection [B33.8]    HPI: Sepsis with hypoxemia related to RSV bronchiolitis with COPD exacerbation.  For details of hospital stay refer to assessment and plan    Procedures Performed: No orders of the defined types were placed in this encounter.      Summary of Hospital Course: Please refer to assessment and plan    Significant Findings, Care, Treatment and Services Provided: Supportive care and steroids IV for RSV bronchiolitis    Complications: None    Condition at Discharge: stable   Physical Exam  Vitals and nursing note reviewed.   Constitutional:       General: She is not in acute distress.     Appearance: She is well-developed.   HENT:      Head: Normocephalic and atraumatic.   Eyes:      Conjunctiva/sclera: Conjunctivae normal.   Cardiovascular:      Rate and Rhythm: Normal rate and regular rhythm.      Heart sounds: No murmur heard.  Pulmonary:      Effort: Pulmonary effort is normal. No respiratory distress.      Breath sounds: Normal breath sounds.   Abdominal:      Palpations: Abdomen is soft.      Tenderness: There is no abdominal tenderness.   Musculoskeletal:         General: No swelling.      Cervical back: Neck supple.   Skin:     General: Skin is warm and dry.   Neurological:      Mental Status: She is alert and oriented to person, place, and time.   Psychiatric:         Mood and Affect: Mood normal.             Discharge  instructions/Information to patient and family:   See after visit summary for information provided to patient and family.      Provisions for Follow-Up Care:  See after visit summary for information related to follow-up care and any pertinent home health orders.      PCP: Dago Zuniga DO    Disposition: Home    Planned Readmission: No    Discharge Statement   I spent 45 minutes discharging the patient. This time was spent on the day of discharge. I had direct contact with the patient on the day of discharge. Additional documentation is required if more than 30 minutes were spent on discharge.     Discharge Medications:  See after visit summary for reconciled discharge medications provided to patient and family.

## 2024-01-11 NOTE — ASSESSMENT & PLAN NOTE
Current creatinine 1.84, we only have 1 previous creatinine which is 1.14.    In her history it looks like he has a C CKD 3 stage A.    Resolved

## 2024-01-11 NOTE — DISCHARGE INSTR - AVS FIRST PAGE
Dear Mirna,  Your discharge from the hospital today  Please note the following:  -Continue steroids for another 5 days after discharge  -Take doxycycline for lung inflammation together with steroids for the next 5 days  -Continue nebulizers as needed for shortness of breath or wheezing  -You will not require oxygen upon discharge  -Avoid smoking  -Follow-up with your primary care provider  -Continue Benadryl as needed for itching until your rash resolves  -For any acute changes in your condition, worsening or new symptoms please come back to the emergency room for evaluation

## 2024-01-11 NOTE — PLAN OF CARE
Problem: PAIN - ADULT  Goal: Verbalizes/displays adequate comfort level or baseline comfort level  Description: Interventions:  - Encourage patient to monitor pain and request assistance  - Assess pain using appropriate pain scale  - Administer analgesics based on type and severity of pain and evaluate response  - Implement non-pharmacological measures as appropriate and evaluate response  - Consider cultural and social influences on pain and pain management  - Notify physician/advanced practitioner if interventions unsuccessful or patient reports new pain  Outcome: Progressing     Problem: INFECTION - ADULT  Goal: Absence or prevention of progression during hospitalization  Description: INTERVENTIONS:  - Assess and monitor for signs and symptoms of infection  - Monitor lab/diagnostic results  - Monitor all insertion sites, i.e. indwelling lines, tubes, and drains  - Monitor endotracheal if appropriate and nasal secretions for changes in amount and color  - [place] appropriate cooling/warming therapies per order  - Administer medications as ordered  - Instruct and encourage patient and family to use good hand hygiene technique  - Identify and instruct in appropriate isolation precautions for identified infection/condition  Outcome: Progressing     Problem: SAFETY ADULT  Goal: Patient will remain free of falls  Description: INTERVENTIONS:  - Educate patient/family on patient safety including physical limitations  - Instruct patient to call for assistance with activity   - Consult OT/PT to assist with strengthening/mobility   - Keep Call bell within reach  - Keep bed low and locked with side rails adjusted as appropriate  - Keep care items and personal belongings within reach  - Initiate and maintain comfort rounds  - Make Fall Risk Sign visible to staff  - Offer Toileting every 2 Hours, in advance of need  - Initiate/Maintain bed alarm  - Obtain necessary fall risk management equipment walker  - Apply yellow  socks and bracelet for high fall risk patients  - Consider moving patient to room near nurses station  Outcome: Progressing  Goal: Maintain or return to baseline ADL function  Description: INTERVENTIONS:  -  Assess patient's ability to carry out ADLs; assess patient's baseline for ADL function and identify physical deficits which impact ability to perform ADLs (bathing, care of mouth/teeth, toileting, grooming, dressing, etc.)  - Assess/evaluate cause of self-care deficits   - Assess range of motion  - Assess patient's mobility; develop plan if impaired  - Assess patient's need for assistive devices and provide as appropriate  - Encourage maximum independence but intervene and supervise when necessary  - Involve family in performance of ADLs  - Assess for home care needs following discharge   - Consider OT consult to assist with ADL evaluation and planning for discharge  - Provide patient education as appropriate  Outcome: Progressing  Goal: Maintains/Returns to pre admission functional level  Description: INTERVENTIONS:  - Perform AM-PAC 6 Click Basic Mobility/ Daily Activity assessment daily.  - Set and communicate daily mobility goal to care team and patient/family/caregiver.   - Collaborate with rehabilitation services on mobility goals if consulted  - Perform Range of Motion 3 times a day.  - Reposition patient every 2 hours.  - Dangle patient 3 times a day  - Stand patient 3 times a day  - Ambulate patient 3 times a day  - Out of bed to chair 3 times a day   - Out of bed for meals 3 times a day  - Out of bed for toileting  - Record patient progress and toleration of activity level   Outcome: Progressing     Problem: DISCHARGE PLANNING  Goal: Discharge to home or other facility with appropriate resources  Description: INTERVENTIONS:  - Identify barriers to discharge w/patient and caregiver  - Arrange for needed discharge resources and transportation as appropriate  - Identify discharge learning needs (meds,  wound care, etc.)  - Arrange for interpretive services to assist at discharge as needed  - Refer to Case Management Department for coordinating discharge planning if the patient needs post-hospital services based on physician/advanced practitioner order or complex needs related to functional status, cognitive ability, or social support system  Outcome: Progressing     Problem: Knowledge Deficit  Goal: Patient/family/caregiver demonstrates understanding of disease process, treatment plan, medications, and discharge instructions  Description: Complete learning assessment and assess knowledge base.  Interventions:  - Provide teaching at level of understanding  - Provide teaching via preferred learning methods  Outcome: Progressing     Problem: RESPIRATORY - ADULT  Goal: Achieves optimal ventilation and oxygenation  Description: INTERVENTIONS:  - Assess for changes in respiratory status  - Assess for changes in mentation and behavior  - Position to facilitate oxygenation and minimize respiratory effort  - Oxygen administered by appropriate delivery if ordered  - Initiate smoking cessation education as indicated  - Encourage broncho-pulmonary hygiene including cough, deep breathe, Incentive Spirometry  - Assess the need for suctioning and aspirate as needed  - Assess and instruct to report SOB or any respiratory difficulty  - Respiratory Therapy support as indicated  Outcome: Progressing

## 2024-01-11 NOTE — PLAN OF CARE
Problem: PAIN - ADULT  Goal: Verbalizes/displays adequate comfort level or baseline comfort level  Description: Interventions:  - Encourage patient to monitor pain and request assistance  - Assess pain using appropriate pain scale  - Administer analgesics based on type and severity of pain and evaluate response  - Implement non-pharmacological measures as appropriate and evaluate response  - Consider cultural and social influences on pain and pain management  - Notify physician/advanced practitioner if interventions unsuccessful or patient reports new pain  Outcome: Adequate for Discharge     Problem: INFECTION - ADULT  Goal: Absence or prevention of progression during hospitalization  Description: INTERVENTIONS:  - Assess and monitor for signs and symptoms of infection  - Monitor lab/diagnostic results  - Monitor all insertion sites, i.e. indwelling lines, tubes, and drains  - Monitor endotracheal if appropriate and nasal secretions for changes in amount and color  - [place] appropriate cooling/warming therapies per order  - Administer medications as ordered  - Instruct and encourage patient and family to use good hand hygiene technique  - Identify and instruct in appropriate isolation precautions for identified infection/condition  Outcome: Adequate for Discharge     Problem: SAFETY ADULT  Goal: Patient will remain free of falls  Description: INTERVENTIONS:  - Educate patient/family on patient safety including physical limitations  - Instruct patient to call for assistance with activity   - Consult OT/PT to assist with strengthening/mobility   - Keep Call bell within reach  - Keep bed low and locked with side rails adjusted as appropriate  - Keep care items and personal belongings within reach  - Initiate and maintain comfort rounds  - Make Fall Risk Sign visible to staff    - Apply yellow socks and bracelet for high fall risk patients  - Consider moving patient to room near nurses station  Outcome: Adequate  for Discharge  Goal: Maintain or return to baseline ADL function  Description: INTERVENTIONS:  -  Assess patient's ability to carry out ADLs; assess patient's baseline for ADL function and identify physical deficits which impact ability to perform ADLs (bathing, care of mouth/teeth, toileting, grooming, dressing, etc.)  - Assess/evaluate cause of self-care deficits   - Assess range of motion  - Assess patient's mobility; develop plan if impaired  - Assess patient's need for assistive devices and provide as appropriate  - Encourage maximum independence but intervene and supervise when necessary  - Involve family in performance of ADLs  - Assess for home care needs following discharge   - Consider OT consult to assist with ADL evaluation and planning for discharge  - Provide patient education as appropriate  Outcome: Adequate for Discharge  Goal: Maintains/Returns to pre admission functional level  Description: INTERVENTIONS:  - Perform AM-PAC 6 Click Basic Mobility/ Daily Activity assessment daily.  - Set and communicate daily mobility goal to care team and patient/family/caregiver.   - Collaborate with rehabilitation services on mobility goals if consulted    - Out of bed for toileting  - Record patient progress and toleration of activity level   Outcome: Adequate for Discharge     Problem: DISCHARGE PLANNING  Goal: Discharge to home or other facility with appropriate resources  Description: INTERVENTIONS:  - Identify barriers to discharge w/patient and caregiver  - Arrange for needed discharge resources and transportation as appropriate  - Identify discharge learning needs (meds, wound care, etc.)  - Arrange for interpretive services to assist at discharge as needed  - Refer to Case Management Department for coordinating discharge planning if the patient needs post-hospital services based on physician/advanced practitioner order or complex needs related to functional status, cognitive ability, or social support  system  Outcome: Adequate for Discharge     Problem: Knowledge Deficit  Goal: Patient/family/caregiver demonstrates understanding of disease process, treatment plan, medications, and discharge instructions  Description: Complete learning assessment and assess knowledge base.  Interventions:  - Provide teaching at level of understanding  - Provide teaching via preferred learning methods  Outcome: Adequate for Discharge     Problem: RESPIRATORY - ADULT  Goal: Achieves optimal ventilation and oxygenation  Description: INTERVENTIONS:  - Assess for changes in respiratory status  - Assess for changes in mentation and behavior  - Position to facilitate oxygenation and minimize respiratory effort  - Oxygen administered by appropriate delivery if ordered  - Initiate smoking cessation education as indicated  - Encourage broncho-pulmonary hygiene including cough, deep breathe, Incentive Spirometry  - Assess the need for suctioning and aspirate as needed  - Assess and instruct to report SOB or any respiratory difficulty  - Respiratory Therapy support as indicated  Outcome: Adequate for Discharge

## 2024-01-11 NOTE — ASSESSMENT & PLAN NOTE
Patient is not on any nebulizers or inhalers prior to admission.  Back in 2020 she did have a CAT scan that showed mild emphysema.  She does not wear oxygen at home.  Patient continues to smoke including marijuana.  CT Chest shows Mild emphysema   In the exacerbation due to RSV infection, receiving IV steroids, scheduled nebulizers  Outpatient follow up with pulmonology for PFTs recommended in 4 to 6 weeks, to be discussed furthermore with PCP    Resolved

## 2024-01-11 NOTE — PLAN OF CARE
Problem: PAIN - ADULT  Goal: Verbalizes/displays adequate comfort level or baseline comfort level  Description: Interventions:  - Encourage patient to monitor pain and request assistance  - Assess pain using appropriate pain scale  - Administer analgesics based on type and severity of pain and evaluate response  - Implement non-pharmacological measures as appropriate and evaluate response  - Consider cultural and social influences on pain and pain management  - Notify physician/advanced practitioner if interventions unsuccessful or patient reports new pain  Outcome: Progressing     Problem: INFECTION - ADULT  Goal: Absence or prevention of progression during hospitalization  Description: INTERVENTIONS:  - Assess and monitor for signs and symptoms of infection  - Monitor lab/diagnostic results  - Monitor all insertion sites, i.e. indwelling lines, tubes, and drains  - Monitor endotracheal if appropriate and nasal secretions for changes in amount and color  - [place] appropriate cooling/warming therapies per order  - Administer medications as ordered  - Instruct and encourage patient and family to use good hand hygiene technique  - Identify and instruct in appropriate isolation precautions for identified infection/condition  Outcome: Progressing     Problem: SAFETY ADULT  Goal: Patient will remain free of falls  Description: INTERVENTIONS:  - Educate patient/family on patient safety including physical limitations  - Instruct patient to call for assistance with activity   - Consult OT/PT to assist with strengthening/mobility   - Keep Call bell within reach  - Keep bed low and locked with side rails adjusted as appropriate  - Keep care items and personal belongings within reach  - Initiate and maintain comfort rounds  - Make Fall Risk Sign visible to staff  - Apply yellow socks and bracelet for high fall risk patients  - Consider moving patient to room near nurses station  Outcome: Progressing  Goal: Maintain or  return to baseline ADL function  Description: INTERVENTIONS:  -  Assess patient's ability to carry out ADLs; assess patient's baseline for ADL function and identify physical deficits which impact ability to perform ADLs (bathing, care of mouth/teeth, toileting, grooming, dressing, etc.)  - Assess/evaluate cause of self-care deficits   - Assess range of motion  - Assess patient's mobility; develop plan if impaired  - Assess patient's need for assistive devices and provide as appropriate  - Encourage maximum independence but intervene and supervise when necessary  - Involve family in performance of ADLs  - Assess for home care needs following discharge   - Consider OT consult to assist with ADL evaluation and planning for discharge  - Provide patient education as appropriate  Outcome: Progressing  Goal: Maintains/Returns to pre admission functional level  Description: INTERVENTIONS:  - Perform AM-PAC 6 Click Basic Mobility/ Daily Activity assessment daily.  - Set and communicate daily mobility goal to care team and patient/family/caregiver.   - Collaborate with rehabilitation services on mobility goals if consulted  - Out of bed for toileting  - Record patient progress and toleration of activity level   Outcome: Progressing     Problem: DISCHARGE PLANNING  Goal: Discharge to home or other facility with appropriate resources  Description: INTERVENTIONS:  - Identify barriers to discharge w/patient and caregiver  - Arrange for needed discharge resources and transportation as appropriate  - Identify discharge learning needs (meds, wound care, etc.)  - Arrange for interpretive services to assist at discharge as needed  - Refer to Case Management Department for coordinating discharge planning if the patient needs post-hospital services based on physician/advanced practitioner order or complex needs related to functional status, cognitive ability, or social support system  Outcome: Progressing     Problem: Knowledge  Deficit  Goal: Patient/family/caregiver demonstrates understanding of disease process, treatment plan, medications, and discharge instructions  Description: Complete learning assessment and assess knowledge base.  Interventions:  - Provide teaching at level of understanding  - Provide teaching via preferred learning methods  Outcome: Progressing     Problem: RESPIRATORY - ADULT  Goal: Achieves optimal ventilation and oxygenation  Description: INTERVENTIONS:  - Assess for changes in respiratory status  - Assess for changes in mentation and behavior  - Position to facilitate oxygenation and minimize respiratory effort  - Oxygen administered by appropriate delivery if ordered  - Initiate smoking cessation education as indicated  - Encourage broncho-pulmonary hygiene including cough, deep breathe, Incentive Spirometry  - Assess the need for suctioning and aspirate as needed  - Assess and instruct to report SOB or any respiratory difficulty  - Respiratory Therapy support as indicated  Outcome: Progressing

## 2024-01-11 NOTE — CASE MANAGEMENT
Case Management Discharge Planning Note    Patient name Mirna Medina  Location /-01 MRN [id number]  : 1950 Date 2024       Current Admission Date: 2024  Current Admission Diagnosis:Acute hypoxic respiratory failure (HCC)   Patient Active Problem List    Diagnosis Date Noted    Disorder of magnesium metabolism 2024    Acute hypoxic respiratory failure (HCC) 2024    RSV bronchitis 2024    HUSAM (acute kidney injury) ([facility]) 2024    Sepsis with acute hypoxic respiratory failure without septic shock ([facility]) 2024    Nonrheumatic aortic valve stenosis 2024    COPD exacerbation ([facility]) 2024    Urinary incontinence 2023    Tobacco dependence due to cigarettes 2022    Chronic diastolic congestive heart failure ([facility]) 2020    Diabetes mellitus type 2, insulin dependent ([facility]) 2020    Coronary arteriosclerosis 2019    PAOD (peripheral arterial occlusive disease) ([facility]) 2019    GERD (gastroesophageal reflux disease) 2015    Anxiety 2012    Depression 2011    Dyslipidemia 2011    Essential hypertension 2011    Hypothyroidism 2011      LOS (days): 3  Geometric Mean LOS (GMLOS) (days): 5.1  Days to GMLOS:2.3     OBJECTIVE:  Risk of Unplanned Readmission Score: 16.58         Current admission status: Inpatient   Preferred Pharmacy:   [id number] - [place] [address]  Phone: [phone] Fax: [phone]    Primary Care Provider: Dago Zuniga DO    Primary Insurance: MEDICARE  Secondary Insurance: AETNA    DISCHARGE DETAILS:    Discharge planning discussed with:: patient  Freedom of Choice: Yes     CM contacted family/caregiver?: No- see comments (patient declined)  Were Treatment Team discharge recommendations reviewed with patient/caregiver?: Yes  Did patient/caregiver verbalize understanding of patient care needs?: Yes  Were  patient/caregiver advised of the risks associated with not following Treatment Team discharge recommendations?: Yes         Requested Home Health Care         Is the patient interested in HHC at discharge?: No    DME Referral Provided  Referral made for DME?: Yes  DME referral completed for the following items:: Renetta Weiner  DME Supplier Name:: [employer]    Other Referral/Resources/Interventions Provided:  Interventions: DME  Referral Comments: [facility] for nebulizer         Treatment Team Recommendation: Home  Discharge Destination Plan:: Home  Transport at Discharge : Self                             IMM Given (Date):: 01/11/24  IMM Given to:: Patient  Family notified:: reviewed verbally with patient        1145 CM submitted [place] referral for nebulizer.     CM contacted patient to discuss discharge today.    CM spoke to patient at the bedside, reviewed DC IMM with patient and informed that patient can stay an additional 4 hours for reconsidering appealing the discharge as the medicare rights were review on the day of discharge. Pt verbalized understanding and feels ready to go home and does not intend to stay 4 hours to reconsider. IMM placed in bin for filing. Patient indicated she is waiting for benadryl to wear off before discharging from [facility] as she has to drive self home.     CM provided nebulizer to bedside nurse for patients discharge.

## 2024-01-11 NOTE — ASSESSMENT & PLAN NOTE
Follows with [facility].  History of chronic mesenteric ischemia status post celiac and SMA stents.  Bilateral internal carotid artery stenosis, left greater than right.    Continue on aspirin, Plavix and statin

## 2024-01-11 NOTE — ASSESSMENT & PLAN NOTE
The patient does not wear oxygen at home.  Required 2L NC on admission  Secondary to RSV bronchitis and COPD exacerbation  Steroids, nebs  Wean O2 as tolerated, down to 1L  Hopeful discharge in the next 24 hours    Resolved, continue prednisone 50 mg/day starting tomorrow for 5 days desaturation assessment completed  Continue prednisone 50 mg/day for 5 days starting tomorrow  Given history of COPD with current active smoking will provide 5 days of doxycycline for anti-inflammatory effect and given COPD exacerbation upon admission  Medically stable for discharge today

## 2024-01-11 NOTE — ASSESSMENT & PLAN NOTE
June 2022, she had a stress test that showed EF of 46%.  Continue Plavix and Lopressor.    Her ACE inhibitor is being held secondary to HUSAM.----- blood pressure is increasing, okay to resume upon discharge>  She is not on a diuretic.      Wt Readings from Last 3 Encounters:   01/08/24 74.8 kg (165 lb)   01/03/24 74.8 kg (165 lb)   12/31/23 72.6 kg (160 lb)

## 2024-01-13 LAB
BACTERIA BLD CULT: NORMAL
BACTERIA BLD CULT: NORMAL

## 2024-01-16 LAB
DME PARACHUTE DELIVERY DATE ACTUAL: NORMAL
DME PARACHUTE DELIVERY DATE REQUESTED: NORMAL
DME PARACHUTE ITEM DESCRIPTION: NORMAL
DME PARACHUTE ORDER STATUS: NORMAL
DME PARACHUTE SUPPLIER NAME: NORMAL
DME PARACHUTE SUPPLIER PHONE: NORMAL

## 2024-01-18 ENCOUNTER — EVALUATION (OUTPATIENT)
Dept: PHYSICAL THERAPY | Facility: CLINIC | Age: 74
End: 2024-01-18
Payer: MEDICARE

## 2024-01-18 DIAGNOSIS — R29.818 NEUROGENIC CLAUDICATION: ICD-10-CM

## 2024-01-18 DIAGNOSIS — M70.62 GREATER TROCHANTERIC BURSITIS OF LEFT HIP: Primary | ICD-10-CM

## 2024-01-18 DIAGNOSIS — M25.552 LEFT HIP PAIN: ICD-10-CM

## 2024-01-18 PROCEDURE — 97140 MANUAL THERAPY 1/> REGIONS: CPT

## 2024-01-18 PROCEDURE — 97162 PT EVAL MOD COMPLEX 30 MIN: CPT

## 2024-01-18 NOTE — PROGRESS NOTES
"PT Evaluation     Today's date: 2024  Patient name: Mirna Medina  : 1950  MRN: [id number]  Referring provider: Richard Bryant DO  Dx:   Encounter Diagnosis     ICD-10-CM    1. Greater trochanteric bursitis of left hip  M70.62       2. Left hip pain  M25.552       3. Neurogenic claudication  R29.818           Start Time: 1345  Stop Time: 1500  Total time in clinic (min): 75 minutes    Assessment  Assessment details: Patient is a 73 y.o. female with medical diagnosis of left hip pain, greater trochanteric bursitis of L hip and neurogenic claudication. Patient presents to PT in severe pain c/ severely antalgic gait mechanics. Assessed for fatigable weakness and LE clonus, (-) for following red flags: complete foot drop, inability to extend great toe, unexplained weight loss, change in B/B habits. Unable to fully assess hip and lumbar spine due to severe pain, although pain appears to vary depending on lumbar spine position. Able to decrease patient's pain from \"a 25\" to \"18\" c/ gentle manual L/S traction, seated lumbar flexion c/ swiss ball and STM to R L/S paraspinals. However, still had severe pain as she was leaving PT resulting in need for PT CGA and support from wall. Recommended patient seek care if pain worsens at all or if any red flags appear. Pain is of severe intensity at this point. She is scheduled for f/u c/ pain management and they are working to get her in earlier. Will put patient on hold until further assessment by pain management. If released by pain management, patient will benefit from skilled physical therapy treatment to address the aforementioned impairments and functional deficits, and accomplish patient goals.  Impairments: abnormal gait, abnormal or restricted ROM, activity intolerance, impaired balance, impaired physical strength, lacks appropriate home exercise program, pain with function, safety issue and poor body mechanics    Goals  STG (3 weeks):  Pain @ worst " <=8/10.  (-) for falls for 2 weeks.    LTG (6 weeks):  Ambulate c/ minimal gait deficits.   75% improvement in L hip and lumbar spine pain.  Patient will be independent with HEP in 6 weeks to allow independent management of condition.      Plan  Plan details: TE, NMR, TA, MT, self-care, and modalities as needed in order to progress through skilled PT focused on ROM, strength, balance, motor control.    Patient would benefit from: skilled physical therapy  Planned modality interventions: cryotherapy and thermotherapy: hydrocollator packs  Planned therapy interventions: manual therapy, neuromuscular re-education, patient education, self care, therapeutic exercise, therapeutic activities, home exercise program and gait training  Frequency: 2x week  Duration in weeks: 6  Plan of Care beginning date: 1/18/2024  Plan of Care expiration date: 2/29/2024        Subjective Evaluation    History of Present Illness  Mechanism of injury: IE: Patient is a 73 y.o. female presenting with left sided hip pain and low back pain. She get pain in her low back that travels down her buttock and occasionally shoots down into her L knee. Denies any saddle anesthesia and bowel or bladder changes.  Symptoms began sometime before Thanksgiving in 2023. Reports she is most comfortable when laying on R side, but is never able to get comfortable. Every other position is very painful especially, walking, standing and sitting upright. PMHx (+) for vascular problems, CHF, HRN, UI, HUSAM, COPD, depression. Is going to see vascular surgery sometime in February 2023. Has been unable to sleep or do most things due to severe pain in L hip and back. Reports L hip gives out on her randomly secondary to pain and she has had 3 falls.  She was given tramadol by ortho, but reports this only takes the edge of the pain off slightly. Patients primary goals for PT are to relieve pain.     Patient Goals  Patient goals for therapy: decreased pain, increased motion,  increased strength and return to sport/leisure activities    Pain  Current pain rating: 10  At best pain ratin  At worst pain rating: 10          Objective  Observation:     -Gait: Minimal stance time on LLE c/ severely antalgic presentation, forward flexion of trunk     -Unable to remain in any position for long period due to severe shots of pain down L side     -Core Stability:Unable to assess fully due to severe pain    TTP: L PSIS due to hip pain    Neuro Screen:  LE Clonus: (-)  Fatigable Weakness: (-) for fatiguable weakness at ankle DF, eversion and GTE    Lumbar ROM (degrees):     IE  FLX:  To knees, severe p!  EXT:   Severe pain  SB (R/L): DNT 2* p!      Repeated Movements Assessment: Assess at future visit    Lumbar/SI/Hip Special Tests:  Slump (R/L):  Passive SLR (R/L): ,crossed SLR (-) b/l  Thigh Thrust (R/L):  Scour (R/L):  VIVIAN (R/L):    LE Strength (MMT Grades, R/L):     IE  Hip FLX (Supine): 3 in seated b/l  Hip ABD (Side-lying): TBA  Knee EXT:  TBA  Knee FLX:   TBA  Ankle DF:  4+ b/l  Ankle Ev:  4+ b/l  GTE:   4+ b/l  Flowsheet Rows      Flowsheet Row Most Recent Value   PT/OT G-Codes    Current Score 25   Projected Score 47               Precautions: Fall Risk    Daily Treatment Diary:      Initial Evaluation Date: 24  POC Expiration: 24  Compliance 24                     Visit Number 1                    Re-Eval  IE                 MC   Foto Captured Y                         Date        Manuals        STM to L Paraspinals KS       L/S Manual Traction KS                       Neuro Re-Ed        PPT        PPT / March        C/L UE/LE Isometric        Hook-lying Clams        S/Lying Clams        Side-stepping        HALEY        Pallof Press        Bridge        Bird-dog        Deadbug                Ther Ex        LTRs        SKTC        Piriformis Stretch        HS Stretch        Hip Flexor Stretch        Leg Press        Seated Flexion c/ SB x10ea       Standing  Hand/Heel Rock @ Counter        Standing: Ext/Abd        Bike                                Ther Activity        Chop/Lift        Box Lift        Wall Squat        Lunge        Sit to Stand                        Modalities

## 2024-01-19 ENCOUNTER — TELEPHONE (OUTPATIENT)
Dept: PAIN MEDICINE | Facility: CLINIC | Age: 74
End: 2024-01-19

## 2024-01-19 NOTE — TELEPHONE ENCOUNTER
Caller:Mirna Medina     Doctor: Dr Trimble    Reason for call: Patient calling would like to speak to Dr Trimble patient is a new patient please advise     Call back#: [phone]

## 2024-01-19 NOTE — TELEPHONE ENCOUNTER
Caller: Mirna    Doctor: Atilio    Reason for call: Pt is returning call to reschedule but wants to know why so far out?     Please advise    Call back#: [phone]

## 2024-01-19 NOTE — TELEPHONE ENCOUNTER
CHARYM moved patient's appointment up with Dr. Trimble to 1/25/24 from 2/12/24 (see 1/18/24 encounter) and to call back if date/time is not good

## 2024-01-19 NOTE — TELEPHONE ENCOUNTER
S/w Pt, advised that [facility] will not be able to treat Pt until initial consult appt on 01/25/24. RN advised Pt if pain is severe and becomes concerning to proceed to Urgent Care or ER for evaluation. Pt verbalized understanding.

## 2024-01-20 ENCOUNTER — NURSE TRIAGE (OUTPATIENT)
Dept: OTHER | Facility: OTHER | Age: 74
End: 2024-01-20

## 2024-01-20 ENCOUNTER — APPOINTMENT (EMERGENCY)
Dept: RADIOLOGY | Facility: HOSPITAL | Age: 74
End: 2024-01-20
Payer: MEDICARE

## 2024-01-20 ENCOUNTER — HOSPITAL ENCOUNTER (EMERGENCY)
Facility: HOSPITAL | Age: 74
Discharge: HOME | End: 2024-01-20
Attending: EMERGENCY MEDICINE
Payer: MEDICARE

## 2024-01-20 VITALS
HEIGHT: 60 IN | WEIGHT: 165 LBS | RESPIRATION RATE: 18 BRPM | TEMPERATURE: 96.8 F | DIASTOLIC BLOOD PRESSURE: 113 MMHG | OXYGEN SATURATION: 97 % | HEART RATE: 85 BPM | BODY MASS INDEX: 32.39 KG/M2 | SYSTOLIC BLOOD PRESSURE: 128 MMHG

## 2024-01-20 DIAGNOSIS — M25.552 LEFT HIP PAIN: Primary | ICD-10-CM

## 2024-01-20 PROCEDURE — 99283 EMERGENCY DEPT VISIT LOW MDM: CPT

## 2024-01-20 PROCEDURE — 72100 X-RAY EXAM L-S SPINE 2/3 VWS: CPT

## 2024-01-20 PROCEDURE — 99284 EMERGENCY DEPT VISIT MOD MDM: CPT | Performed by: PHYSICIAN ASSISTANT

## 2024-01-20 PROCEDURE — 73502 X-RAY EXAM HIP UNI 2-3 VIEWS: CPT

## 2024-01-20 PROCEDURE — 96372 THER/PROPH/DIAG INJ SC/IM: CPT

## 2024-01-20 RX ORDER — MORPHINE SULFATE 4 MG/ML
4 INJECTION, SOLUTION INTRAMUSCULAR; INTRAVENOUS ONCE
Status: COMPLETED | OUTPATIENT
Start: 2024-01-20 | End: 2024-01-20

## 2024-01-20 RX ORDER — ONDANSETRON 4 MG/1
4 TABLET, FILM COATED ORAL EVERY 6 HOURS
Qty: 12 TABLET | Refills: 0 | Status: SHIPPED | OUTPATIENT
Start: 2024-01-20

## 2024-01-20 RX ORDER — MORPHINE SULFATE 15 MG/1
15 TABLET ORAL EVERY 6 HOURS PRN
Qty: 9 TABLET | Refills: 0 | Status: SHIPPED | OUTPATIENT
Start: 2024-01-20 | End: 2024-01-23

## 2024-01-20 RX ORDER — LIDOCAINE 50 MG/G
1 PATCH TOPICAL DAILY
Qty: 6 PATCH | Refills: 0 | Status: SHIPPED | OUTPATIENT
Start: 2024-01-20

## 2024-01-20 RX ORDER — LIDOCAINE 50 MG/G
1 PATCH TOPICAL ONCE
Status: DISCONTINUED | OUTPATIENT
Start: 2024-01-20 | End: 2024-01-20 | Stop reason: HOSPADM

## 2024-01-20 RX ADMIN — MORPHINE SULFATE 4 MG: 4 INJECTION INTRAVENOUS at 10:18

## 2024-01-20 RX ADMIN — LIDOCAINE 5% 1 PATCH: 700 PATCH TOPICAL at 10:18

## 2024-01-20 NOTE — DISCHARGE INSTRUCTIONS
Please continue with symptomatic care for your hip as we discussed.  Follow-up with Dr. Trimble and your orthopedic doctor.  Take medications as prescribed.  Please know that morphine is an addicting medication it may cause drowsiness and you should avoid operate any heavy machinery while taking this medication.  Also can be constipating.  Please return with any new or worsening symptoms

## 2024-01-20 NOTE — TELEPHONE ENCOUNTER
"Regarding: hip pain  ----- Message from Tanya Sr sent at 1/20/2024  8:07 AM EST -----  \"I am having severe hip pain and I am unable to sleep. What should I do?\"    "

## 2024-01-20 NOTE — TELEPHONE ENCOUNTER
"Patient is calling with severe hip pain, recent diagnosis of neurogenic claudication in left hip. Patient was tearful on phone states that she is not able to sleep and barely moving. States she's frequently dragging the leg because it hurts more to pick it up and sometimes is stumbling. Pain radiating down to her ankle. Nothing OTC med/topical is working. She used the prescribed tramdol without relief, and the Percocet was mildly improving symptoms but she is out of them, was short supply. Started PT this week but was very painful and limited ability there.    Per Ortho on-call provider, referred to ER for further evaluation.    Reason for Disposition   [1] SEVERE pain (e.g., excruciating, unable to do any normal activities) AND [2] not improved after 2 hours of pain medicine    Answer Assessment - Initial Assessment Questions  1. LOCATION and RADIATION: \"Where is the pain located?\"       Severe hip pain Left  2. QUALITY: \"What does the pain feel like?\"  (e.g., sharp, dull, aching, burning)      Constant, sharp  3. SEVERITY: \"How bad is the pain?\" \"What does it keep you from doing?\"   (Scale 1-10; or mild, moderate, severe)    -  MILD (1-3): doesn't interfere with normal activities     -  MODERATE (4-7): interferes with normal activities (e.g., work or school) or awakens from sleep, limping     -  SEVERE (8-10): excruciating pain, unable to do any normal activities, unable to walk      Severe  4. ONSET: \"When did the pain start?\" \"Does it come and go, or is it there all the time?\"      Chronic issue worsening since last ER visit is severe.  5. WORK OR EXERCISE: \"Has there been any recent work or exercise that involved this part of the body?\"       Denies  6. CAUSE: \"What do you think is causing the hip pain?\"       New hip diagnosis ortho.  7. AGGRAVATING FACTORS: \"What makes the hip pain worse?\" (e.g., walking, climbing stairs, running)      Movement  8. OTHER SYMPTOMS: \"Do you have any other symptoms?\" (e.g., back " pain, pain shooting down leg,  fever, rash)      Sometimes has to drag leg, can't lift it. Sometimes stubbles.    Protocols used: Hip Pain-ADULT-AH

## 2024-01-20 NOTE — ED PROVIDER NOTES
History  Chief Complaint   Patient presents with    Hip Pain     Pt c/o chronic hip pain radiating down to ankle. Taking OTC medications w/o relief. Also takes oxycodone with relief but took last one 3 days ago. Denies recent fall/trauma/injury. Ortho instructed pt to come to ED per further eval/tx.     73-year-old female presents to the emergency department for evaluation of acute on chronic left hip pain/low back pain.  Patient states this has been a chronic ongoing issue.  Has been following with orthopedics.  States that she has very narrowed disc in the low back and radiating pain into the left hip.  Per recent Ortho note patient has a history of neurogenic claudication in the left hip.  Patient states she is unable to tolerate the pain at home.  States she is unable to get comfortable.  Has not been sleeping due to the pain.  States that she was instructed to try physical therapy prior to any surgical interventions had 1 visit with physical therapy and did not have improvement of symptoms.  She denies any rashes redness warmth or swelling.  Denies any fevers or chills.  She denies IV drug use.  Patient denies any loss of bowel or bladder control, rectal numbness or saddle paresthesias. States she was prescribed a short course of Percocet at the beginning of the month which did somewhat improve pain but states she is now out of that medication.  Patient states she did drive herself here, I made patient aware we are able to give her any narcotic medication as she is clearly driving patient states she will be able to get a ride home.  She denies any recent falls/traumas. No fevers or chills        Prior to Admission Medications   Prescriptions Last Dose Informant Patient Reported? Taking?   Cholecalciferol 50 MCG (2000 UT) CAPS   Yes No   Sig: Take 2,000 Units by mouth daily   Empagliflozin (JARDIANCE) 10 MG TABS tablet   Yes No   Sig: Take 10 mg by mouth daily   Patient not taking: Reported on 1/8/2024    Magnesium Oxide 500 MG TABS   Yes No   Sig: Take 1,000 mg by mouth 3 (three) times a day   Multiple Vitamins-Minerals (Multivitamin Women) TABS   Yes No   Sig: Take 1 tablet by mouth every morning   Omega-3 Fatty Acids (fish oil) 1,000 mg   Yes No   Sig: Take 2 g by mouth 2 (two) times a day   Sodium Caprylate POWD   Yes No   Sig: Take by mouth   acetaminophen (TYLENOL) 325 mg tablet   No No   Sig: Take 2 tablets (650 mg total) by mouth every 6 (six) hours as needed for mild pain, fever or headaches   aspirin (ECOTRIN LOW STRENGTH) 81 mg EC tablet   Yes No   Sig: Take 81 mg by mouth daily   atorvastatin (LIPITOR) 80 mg tablet   Yes No   Sig: Take 80 mg by mouth daily at bedtime   canagliflozin (Invokana) 100 mg   Yes No   clindamycin (CLEOCIN) 150 mg capsule   Yes No   clopidogrel (PLAVIX) 75 mg tablet   Yes No   Sig: Take 75 mg by mouth daily   diphenhydrAMINE (BENADRYL) 25 mg tablet   No No   Sig: Take 1 tablet (25 mg total) by mouth every 6 (six) hours as needed for itching   escitalopram (LEXAPRO) 20 mg tablet   Yes No   Sig: Take 20 mg by mouth daily   fenofibrate (TRICOR) 48 mg tablet   Yes No   finasteride (PROSCAR) 5 mg tablet   Yes No   Sig: Take 5 mg by mouth daily   gabapentin (NEURONTIN) 100 mg capsule   Yes No   Sig: Take 100 mg by mouth 3 (three) times a day   guaiFENesin (MUCINEX) 600 mg 12 hr tablet   No No   Sig: Take 1 tablet (600 mg total) by mouth 2 (two) times a day   insulin aspart (NovoLOG FlexPen) 100 UNIT/ML injection pen   Yes No   Sig: 3 (three) times a day   insulin glargine (LANTUS SOLOSTAR) 100 units/mL injection pen   Yes No   Sig: Inject 48 Units under the skin daily at bedtime   ipratropium (ATROVENT) 0.02 % nebulizer solution   No No   Sig: Take 2.5 mL (0.5 mg total) by nebulization 3 (three) times a day   levalbuterol (XOPENEX) 1.25 mg/3 mL nebulizer solution   No No   Sig: Take 3 mL (1.25 mg total) by nebulization 3 (three) times a day   levothyroxine 125 mcg tablet  Self Yes No    Sig: Take 150 mcg by mouth daily   levothyroxine 150 mcg tablet   Yes No   lidocaine (Lidoderm) 5 %   No No   Sig: Apply 1 patch topically over 12 hours daily for 15 days Remove & Discard patch within 12 hours or as directed by MD   lisinopril (ZESTRIL) 20 mg tablet   Yes No   Sig: Take 20 mg by mouth daily   magnesium 30 MG tablet  Self Yes No   Sig: Take 71.5 mg by mouth 2 (two) times a day 71.5mg OTC - magnesium chloride (slow Mag)   magnesium chloride-calcium (SLOW-MAG) 71.5-119 mg   Yes No   Sig: Take 143 mg by mouth daily   metoprolol tartrate (LOPRESSOR) 25 mg tablet   Yes No   Sig: Take 25 mg by mouth 2 (two) times a day   nicotine (NICODERM CQ) 7 mg/24hr TD 24 hr patch   No No   Sig: Place 1 patch on the skin over 24 hours daily   nicotine polacrilex (NICORETTE) 2 mg gum   Yes No   nitrofurantoin (MACROBID) 100 mg capsule   Yes No   omeprazole (PriLOSEC OTC) 20 MG tablet   Yes No   Sig: Take 20 mg by mouth daily   omeprazole (PriLOSEC) 20 mg delayed release capsule   Yes No   oxyCODONE (ROXICODONE) 5 immediate release tablet   Yes No   oxyCODONE-acetaminophen (Percocet) 5-325 mg per tablet   No No   Sig: Take 1 tablet by mouth every 4 (four) hours as needed for moderate pain for up to 10 doses Max Daily Amount: 6 tablets   predniSONE 10 mg tablet   No No   Sig: Take 5 tablets (50 mg total) by mouth daily Do not start before January 12, 2024.   sertraline (ZOLOFT) 50 mg tablet   Yes No   traMADol (Ultram) 50 mg tablet   No No   Sig: Take 1 tablet (50 mg total) by mouth every 6 (six) hours as needed for moderate pain      Facility-Administered Medications: None       Past Medical History:   Diagnosis Date    CHF (congestive heart failure) (HCC)     Coronary artery disease     Diabetes mellitus (HCC)     Disease of thyroid gland        Past Surgical History:   Procedure Laterality Date    APPENDECTOMY      CARDIAC SURGERY      CORONARY ANGIOPLASTY WITH STENT PLACEMENT      HERNIA REPAIR         History  reviewed. No pertinent family history.  I have reviewed and agree with the history as documented.    E-Cigarette/Vaping    E-Cigarette Use Never User      E-Cigarette/Vaping Substances     Social History     Tobacco Use    Smoking status: Every Day     Current packs/day: 0.25     Types: Cigarettes    Smokeless tobacco: Never   Vaping Use    Vaping status: Never Used   Substance Use Topics    Alcohol use: Not Currently    Drug use: Yes     Types: Marijuana       Review of Systems   Constitutional:  Negative for appetite change, fatigue and fever.   Respiratory: Negative.     Cardiovascular: Negative.    Gastrointestinal: Negative.    Musculoskeletal:  Positive for arthralgias.   Skin: Negative.    Neurological: Negative.    All other systems reviewed and are negative.      Physical Exam  Physical Exam  Vitals and nursing note reviewed.   Constitutional:       General: She is not in acute distress.     Appearance: Normal appearance. She is not ill-appearing, toxic-appearing or diaphoretic.      Comments: +uncomfortable    HENT:      Head: Normocephalic.   Eyes:      Conjunctiva/sclera: Conjunctivae normal.      Pupils: Pupils are equal, round, and reactive to light.   Cardiovascular:      Rate and Rhythm: Normal rate and regular rhythm.   Pulmonary:      Effort: Pulmonary effort is normal.   Abdominal:      General: Abdomen is flat. There is no distension.      Palpations: Abdomen is soft.      Tenderness: There is no abdominal tenderness.   Musculoskeletal:         General: Tenderness present. No swelling.        Back:    Skin:     General: Skin is warm and dry.      Findings: No bruising, erythema or rash.   Neurological:      General: No focal deficit present.      Mental Status: She is alert and oriented to person, place, and time.      Deep Tendon Reflexes:      Reflex Scores:       Patellar reflexes are 2+ on the right side and 2+ on the left side.  Psychiatric:         Mood and Affect: Mood normal.          Vital Signs  ED Triage Vitals [01/20/24 1001]   Temperature Pulse Respirations Blood Pressure SpO2   (!) 96.8 °F (36 °C) 85 18 (!) 128/113 97 %      Temp src Heart Rate Source Patient Position - Orthostatic VS BP Location FiO2 (%)   -- Monitor Lying Right arm --      Pain Score       10 - Worst Possible Pain           Vitals:    01/20/24 1001   BP: (!) 128/113   Pulse: 85   Patient Position - Orthostatic VS: Lying         Visual Acuity      ED Medications  Medications   lidocaine (LIDODERM) 5 % patch 1 patch (1 patch Topical Medication Applied 1/20/24 1018)   morphine injection 4 mg (4 mg Intramuscular Given 1/20/24 1018)       Diagnostic Studies  Results Reviewed       None                   XR spine lumbar 2 or 3 views injury    (Results Pending)   XR hip/pelv 2-3 vws left if performed    (Results Pending)              Procedures  Procedures         ED Course  ED Course as of 01/20/24 1318   Sat Jan 20, 2024   1050 Discussed results and findings with the patient.  ED interpretation of x-rays noted for arthritic changes with no obvious osseous injury. she did have improvement of symptoms with morphine.  At this point patient feels well enough to return home.  We did discuss follow-up with orthopedics.  Patient also states she has a follow-up appointment with Dr. Trimble.                               SBIRT 22yo+      Flowsheet Row Most Recent Value   Initial Alcohol Screen: US AUDIT-C     1. How often do you have a drink containing alcohol? 0 Filed at: 01/20/2024 1022   2. How many drinks containing alcohol do you have on a typical day you are drinking?  0 Filed at: 01/20/2024 1022   3a. Male UNDER 65: How often do you have five or more drinks on one occasion? 0 Filed at: 01/20/2024 1022   3b. FEMALE Any Age, or MALE 65+: How often do you have 4 or more drinks on one occassion? 0 Filed at: 01/20/2024 1022   Audit-C Score 0 Filed at: 01/20/2024 1022   CLIFFORD: How many times in the past year have you...     Used an illegal drug or used a prescription medication for non-medical reasons? Never Filed at: 01/20/2024 1022                      Medical Decision Making  73 year old female presents to the ED for evaluation of acute on chronic left hip pain. Vitals and medical record.  Patient at risk for the following but not limited to arthritic pain, exacerbation of known neurogenic claudication of the left hip, avascular necrosis, septic joint, cauda equina syndrome.  Patient denied any red flag symptoms.  No recent trauma or injury, lower suspicion for fall.  X-rays were confirming for arthritic changes and no obvious osseous injury.  Patient had no overlying skin abnormalities.  She denied any red flag symptoms for cauda equina syndrome.  There is no redness or warmth, no fevers, low concern for septic joint.  We discussed symptomatic treatment at home and appropriate follow-up with orthopedics and pain management and patient verbalized understanding.  She is clinically and hemodynamically stable for discharge.    Problems Addressed:  Left hip pain: acute illness or injury    Amount and/or Complexity of Data Reviewed  Radiology: ordered.    Risk  Prescription drug management.             Disposition  Final diagnoses:   Left hip pain     Time reflects when diagnosis was documented in both MDM as applicable and the Disposition within this note       Time User Action Codes Description Comment    1/20/2024 10:51 AM Sneha Amador Add [M25.552] Left hip pain           ED Disposition       ED Disposition   Discharge    Condition   Stable    Date/Time   Sat Jan 20, 2024 1051    Comment   Mirna Medina discharge to home/self care.                   Follow-up Information       Follow up With Specialties Details Why Contact Info    Dago Zuniga DO Family Medicine   [address] [place] [address]  [phone]      Rudy Trimble MD Pain Medicine, Interventional Radiology   [address] [place] [address]  [address]  [facility] [address]  [phone]              Discharge Medication List as of 1/20/2024 10:54 AM        START taking these medications    Details   morphine (MSIR) 15 mg tablet Take 1 tablet (15 mg total) by mouth every 6 (six) hours as needed for severe pain for up to 3 days Max Daily Amount: 60 mg, Starting Sat 1/20/2024, Until Tue 1/23/2024 at 2359, Normal      ondansetron (ZOFRAN) 4 mg tablet Take 1 tablet (4 mg total) by mouth every 6 (six) hours, Starting Sat 1/20/2024, Normal           CONTINUE these medications which have CHANGED    Details   lidocaine (Lidoderm) 5 % Apply 1 patch topically over 12 hours daily Remove & Discard patch within 12 hours or as directed by MD, Starting Sat 1/20/2024, Normal           CONTINUE these medications which have NOT CHANGED    Details   acetaminophen (TYLENOL) 325 mg tablet Take 2 tablets (650 mg total) by mouth every 6 (six) hours as needed for mild pain, fever or headaches, Starting Thu 1/11/2024, Normal      aspirin (ECOTRIN LOW STRENGTH) 81 mg EC tablet Take 81 mg by mouth daily, Historical Med      atorvastatin (LIPITOR) 80 mg tablet Take 80 mg by mouth daily at bedtime, Starting Thu 4/8/2021, Historical Med      canagliflozin (Invokana) 100 mg Historical Med      Cholecalciferol 50 MCG (2000 UT) CAPS Take 2,000 Units by mouth daily, Historical Med      clindamycin (CLEOCIN) 150 mg capsule Historical Med      clopidogrel (PLAVIX) 75 mg tablet Take 75 mg by mouth daily, Starting Wed 6/16/2021, Historical Med      diphenhydrAMINE (BENADRYL) 25 mg tablet Take 1 tablet (25 mg total) by mouth every 6 (six) hours as needed for itching, Starting Thu 1/11/2024, Normal      Empagliflozin (JARDIANCE) 10 MG TABS tablet Take 10 mg by mouth daily, Starting Tue 11/7/2023, Until Wed 11/6/2024, Historical Med      escitalopram (LEXAPRO) 20 mg tablet Take 20 mg by mouth daily, Starting Mon 3/15/2021, Historical Med      fenofibrate (TRICOR) 48 mg tablet Historical Med       finasteride (PROSCAR) 5 mg tablet Take 5 mg by mouth daily, Starting Mon 4/19/2021, Historical Med      gabapentin (NEURONTIN) 100 mg capsule Take 100 mg by mouth 3 (three) times a day, Starting Fri 1/8/2021, Historical Med      guaiFENesin (MUCINEX) 600 mg 12 hr tablet Take 1 tablet (600 mg total) by mouth 2 (two) times a day, Starting Thu 1/11/2024, Normal      insulin aspart (NovoLOG FlexPen) 100 UNIT/ML injection pen 3 (three) times a day, Starting Fri 3/26/2021, Historical Med      insulin glargine (LANTUS SOLOSTAR) 100 units/mL injection pen Inject 48 Units under the skin daily at bedtime, Starting Wed 4/7/2021, Historical Med      ipratropium (ATROVENT) 0.02 % nebulizer solution Take 2.5 mL (0.5 mg total) by nebulization 3 (three) times a day, Starting Thu 1/11/2024, Normal      levalbuterol (XOPENEX) 1.25 mg/3 mL nebulizer solution Take 3 mL (1.25 mg total) by nebulization 3 (three) times a day, Starting Thu 1/11/2024, Normal      levothyroxine 150 mcg tablet Historical Med      lisinopril (ZESTRIL) 20 mg tablet Take 20 mg by mouth daily, Starting Wed 6/16/2021, Historical Med      magnesium 30 MG tablet Take 71.5 mg by mouth 2 (two) times a day 71.5mg OTC - magnesium chloride (slow Mag), Historical Med      magnesium chloride-calcium (SLOW-MAG) 71.5-119 mg Take 143 mg by mouth daily, Starting Tue 2/28/2023, Until Fri 2/23/2024, Historical Med      Magnesium Oxide 500 MG TABS Take 1,000 mg by mouth 3 (three) times a day, Historical Med      metoprolol tartrate (LOPRESSOR) 25 mg tablet Take 25 mg by mouth 2 (two) times a day, Starting Fri 1/8/2021, Historical Med      Multiple Vitamins-Minerals (Multivitamin Women) TABS Take 1 tablet by mouth every morning, Historical Med      nicotine (NICODERM CQ) 7 mg/24hr TD 24 hr patch Place 1 patch on the skin over 24 hours daily, Starting Fri 1/12/2024, Normal      nicotine polacrilex (NICORETTE) 2 mg gum Historical Med      nitrofurantoin (MACROBID) 100 mg capsule  Historical Med      Omega-3 Fatty Acids (fish oil) 1,000 mg Take 2 g by mouth 2 (two) times a day, Starting Mon 10/19/2020, Until Wed 1/3/2024, Historical Med      omeprazole (PriLOSEC OTC) 20 MG tablet Take 20 mg by mouth daily, Historical Med      omeprazole (PriLOSEC) 20 mg delayed release capsule Historical Med      oxyCODONE (ROXICODONE) 5 immediate release tablet Historical Med      oxyCODONE-acetaminophen (Percocet) 5-325 mg per tablet Take 1 tablet by mouth every 4 (four) hours as needed for moderate pain for up to 10 doses Max Daily Amount: 6 tablets, Starting Mon 1/1/2024, Normal      predniSONE 10 mg tablet Take 5 tablets (50 mg total) by mouth daily Do not start before January 12, 2024., Starting Fri 1/12/2024, Normal      sertraline (ZOLOFT) 50 mg tablet Historical Med      Sodium Caprylate POWD Take by mouth, Historical Med      traMADol (Ultram) 50 mg tablet Take 1 tablet (50 mg total) by mouth every 6 (six) hours as needed for moderate pain, Starting Sun 12/31/2023, Normal             No discharge procedures on file.    PDMP Review         Value Time User    PDMP Reviewed  Yes 1/20/2024 10:05 AM Sneha Amador PA-C            ED Provider  Electronically Signed by             Sneha Amador PA-C  01/20/24 4320

## 2024-01-23 RX ORDER — OFLOXACIN 3 MG/ML
SOLUTION/ DROPS OPHTHALMIC
COMMUNITY
End: 2024-01-25

## 2024-01-23 RX ORDER — BLOOD SUGAR DIAGNOSTIC
1 STRIP MISCELLANEOUS
COMMUNITY
Start: 2023-09-13

## 2024-01-23 RX ORDER — BUPROPION HYDROCHLORIDE 150 MG/1
150 TABLET, EXTENDED RELEASE ORAL EVERY 12 HOURS
COMMUNITY
Start: 2023-11-17

## 2024-01-23 RX ORDER — AMLODIPINE BESYLATE 5 MG/1
TABLET ORAL
COMMUNITY
Start: 2023-09-25

## 2024-01-23 RX ORDER — KETOROLAC TROMETHAMINE 5 MG/ML
SOLUTION OPHTHALMIC
COMMUNITY
End: 2024-01-25

## 2024-01-25 ENCOUNTER — CONSULT (OUTPATIENT)
Dept: PAIN MEDICINE | Facility: CLINIC | Age: 74
End: 2024-01-25
Payer: MEDICARE

## 2024-01-25 VITALS
HEIGHT: 60 IN | BODY MASS INDEX: 30.63 KG/M2 | HEART RATE: 65 BPM | OXYGEN SATURATION: 96 % | TEMPERATURE: 97 F | WEIGHT: 156 LBS | SYSTOLIC BLOOD PRESSURE: 156 MMHG | DIASTOLIC BLOOD PRESSURE: 82 MMHG

## 2024-01-25 DIAGNOSIS — N18.32 STAGE 3B CHRONIC KIDNEY DISEASE (HCC): ICD-10-CM

## 2024-01-25 DIAGNOSIS — M54.16 LUMBAR RADICULOPATHY: ICD-10-CM

## 2024-01-25 DIAGNOSIS — I74.10: ICD-10-CM

## 2024-01-25 PROCEDURE — 1124F ACP DISCUSS-NO DSCNMKR DOCD: CPT | Performed by: ANESTHESIOLOGY

## 2024-01-25 PROCEDURE — 99204 OFFICE O/P NEW MOD 45 MIN: CPT | Performed by: ANESTHESIOLOGY

## 2024-01-25 RX ORDER — GABAPENTIN 300 MG/1
300 CAPSULE ORAL 3 TIMES DAILY
Qty: 90 CAPSULE | Refills: 2 | Status: SHIPPED | OUTPATIENT
Start: 2024-01-25

## 2024-01-25 NOTE — PATIENT INSTRUCTIONS
Core Strengthening Exercises   WHAT YOU NEED TO KNOW:   Your core includes the muscles of your lower back, hip, pelvis, and abdomen. Core strengthening exercises help heal and strengthen these muscles. This helps prevent another injury, and keeps your pelvis, spine, and hips in the correct position.  DISCHARGE INSTRUCTIONS:   Call your doctor or physical therapist if:   You have sharp or worsening pain during exercise or at rest.    You have questions or concerns about your shoulder exercises.    Safety tips:  Talk to your healthcare provider before you start an exercise program. A physical therapist can teach you how to do core strengthening exercises safely.  Do the exercises on a mat or firm surface.  A firm surface will support your spine and prevent low back pain. Do not do these exercises on a bed.    Move slowly and smoothly.  Avoid fast or jerky motions.    Stop if you feel pain.  You may feel some discomfort at first, but you should not feel pain. Tell your provider or physical therapist if you have pain while you exercise. Regular exercise will help decrease your discomfort over time.    Breathe normally during core exercises.  Do not hold your breath. This may cause an increase in blood pressure and prevent muscle strengthening. Your healthcare provider will tell you when to inhale and exhale during the exercise.    Begin all of your exercises with abdominal bracing.  Abdominal bracing helps warm up your core muscles. You can also practice abdominal bracing throughout the day. Lie on your back with your knees bent and feet flat on the floor. Place your arms in a relaxed position beside your body. Tighten your abdominal muscles. Pull your belly button in and up toward your spine. Hold for 5 seconds. Relax your muscles. Repeat 10 times.       Core strengthening exercises:  Your healthcare provider will tell you how often to do these exercises. The provider will also tell you how many repetitions of each  exercise you should do. Hold each exercise for 5 seconds or as directed. As you get stronger, increase your hold to 10 to 15 seconds. You can do some of these exercises on a stability ball, or with a weight. Ask your healthcare provider how to use a stability ball or weight for these exercises:  Bridging:  Lie on your back with your knees bent and feet flat on the floor. Rest your arms at your side. Tighten your buttocks, and then lift your hips 1 inch off the floor. Hold for 5 seconds. When you can do this exercise without pain for 10 seconds, increase the distance you lift your hips. A good goal is to be able to lift your hips so that your shoulders, hips, and knees are in a straight line.         Dead bug:  Lie on your back with your knees bent and feet flat on the floor. Place your arms in a relaxed position beside your body. Begin with abdominal bracing. Next, raise one leg, keeping your knee bent. Hold for 5 seconds. Repeat with the other leg. When you can do this exercise without pain for 10 to 15 seconds, you may raise one straight leg and hold. Repeat with the other leg.         Quadruped:  Place your hands and knees on the floor. Keep your wrists directly below your shoulders and your knees directly below your hips. Pull your belly button in toward your spine. Do not flatten or arch your back. Tighten your abdominal muscles below your belly button. Hold for 5 seconds. When you can do this exercise without pain for 10 to 15 seconds, you may extend one arm and hold. Repeat on the other side.         Side bridge exercises:      Standing side bridge:  Stand next to a wall and extend one arm toward the wall. Place your palm flat on the wall with your fingers pointing upward. Begin with abdominal bracing. Next, without moving your feet, slowly bend your arm to 90 degrees. Hold for 5 seconds. Repeat on the other side. When you can do this exercise without pain for 10 to 15 seconds, you may do the bent leg side  bridge on the floor.         Bent leg side bridge:  Lie on one side with your legs, hips, and shoulders in a straight line. Prop yourself up onto your forearm so your elbow is directly below your shoulder. Bend your knees back to 90 degrees. Begin with abdominal bracing. Next, lift your hips and balance yourself on your forearm and knees. Hold for 5 seconds. Repeat on the other side. When you can do this exercise without pain for 10 to 15 seconds, you may do the straight leg side bridge on the floor.         Straight leg side bridge:  Lie on one side with your legs, hips, and shoulders in a straight line. Prop yourself up onto your forearm so your elbow is directly below your shoulder. Begin with abdominal bracing. Lift your hips off the floor and balance yourself on your forearm and the outside of your flexed foot. Do not let your ankle bend sideways. Hold for 5 seconds. Repeat on the other side. When you can do this exercise without pain for 10 to 15 seconds, ask your healthcare provider for more advanced exercises.       Superman:  Lie on your stomach. Extend your arms forward on the floor. Tighten your abdominal muscles and lift your right hand and left leg off the floor. Hold this position. Slowly return to the starting position. Tighten your abdominal muscles and lift your left hand and right leg off the floor. Hold this position. Slowly return to the starting position.         Clam:  Lie on your side with your knees bent. Put your bottom arm under your head to keep your neck in line. Put your top hand on your hip to keep your pelvis from moving. Put your heels together, and keep them together during this exercise. Slowly raise your top knee toward the ceiling. Then lower your leg so your knees are together. Repeat this exercise 10 times. Then switch sides and do the exercise 10 times with the other leg.         Curl up:  Lie on your back with your knees bent and feet flat on the floor. Place your hands, palms  down, underneath your lower back. Next, with your elbows on the floor, lift your shoulders and chest 2 to 3 inches off the floor. Keep your head in line with your shoulders. Hold this position. Slowly return to the starting position.         Straight leg raises:  Lie on your back with one leg straight. Bend the other knee and place your foot flat on the floor. Tighten your abdominal muscles. Keep your leg straight and slowly lift it straight up 6 to 12 inches off the floor. Hold this position. Lower your leg slowly. Do as many repetitions as directed on this side. Repeat with the other leg.         Plank:  Lie on your stomach. Bend your elbows and place your forearms flat on the floor. Lift your chest, stomach, and knees off the floor. Make sure your elbows are below your shoulders. Your body should be in a straight line. Do not let your hips or lower back sink to the ground. Squeeze your abdominal muscles together and hold for 15 seconds. To make this exercise harder, hold for 30 seconds or lift 1 leg at a time.         Bicycles:  Lie on your back. Bend both knees and bring them toward your chest. Your calves should be parallel to the floor. Place the palms of your hands on the back of your head. Straighten your right leg and keep it lifted 2 inches off the floor. Raise your head and shoulders off the floor and twist towards your left. Keep your head and shoulders lifted. Bend your right knee while you straighten your left leg. Keep your left leg 2 inches off the floor. Twist your head and chest towards the left leg. Continue to straighten 1 leg at a time and twist.       Follow up with your doctor or physical therapist as directed:  Write down your questions so you remember to ask them during your visits.  © Copyright [employer] 2023 Information is for End User's use only and may not be sold, redistributed or otherwise used for commercial purposes.  The above information is an  only. It is not  intended as medical advice for individual conditions or treatments. Talk to your doctor, nurse or pharmacist before following any medical regimen to see if it is safe and effective for you.  Gabapentin (By mouth)   Gabapentin (yuniel-a-PEN-tin)  Treats seizures and pain caused by shingles.   Brand Name(s): FusePaq Fanatrex, Neurontin   There may be other brand names for this medicine.  When This Medicine Should Not Be Used:   This medicine is not right for everyone. Do not use it if you had an allergic reaction to gabapentin.  How to Use This Medicine:   Capsule, Liquid, Tablet  Take your medicine as directed. Your dose may need to be changed several times to find what works best for you. If you have epilepsy, do not allow more than 12 hours to pass between doses.  Capsule: Swallow the capsule whole with plenty of water. Do not open, crush, or chew it.  Gralise® tablet: Swallow the tablet whole . Do not crush, break, or chew it.  Neurontin® tablet: If you break a tablet into 2 pieces, use the second half as your next dose. Do not use the half-tablet if the whole tablet has been cut or broken after 28 days.  Oral liquid: Measure the oral liquid medicine with a marked measuring spoon, oral syringe, or medicine cup.  This medicine should come with a Medication Guide. Ask your pharmacist for a copy if you do not have one.  Missed dose: Take a dose as soon as you remember. If it is almost time for your next dose, wait until then and take a regular dose. Do not take extra medicine to make up for a missed dose.  Store the medicine in a closed container at room temperature, away from heat, moisture, and direct light. Store the Neurontin® oral liquid in the refrigerator. Do not freeze.  Drugs and Foods to Avoid:   Ask your doctor or pharmacist before using any other medicine, including over-the-counter medicines, vitamins, and herbal products.  Some medicines can affect how gabapentin works. Tell your doctor if you also using  hydrocodone or morphine.  If you take an antacid, wait at least 2 hours before you take gabapentin.  Do not drink alcohol while you are using this medicine.  Tell your doctor if you use anything else that makes you sleepy. Some examples are allergy medicine, narcotic pain medicine, and alcohol. Tell your doctor if you are also using lorazepam, oxycodone, or zolpidem.  Warnings While Using This Medicine:   Tell your doctor if you are pregnant or breastfeeding, or if you have kidney problems (including patients receiving dialysis) or lung problems. Tell your doctor if you have a history of depression or mental health problems.  This medicine may cause the following problems:  Drug reaction with eosinophilia and systemic symptoms (DRESS) or multiorgan hypersensitivity, which may damage the liver, kidney, blood, heart, or muscles  Changes in mood or behavior, including suicidal thoughts or behavior  Respiratory depression (serious breathing problem that can be life-threatening), when used with narcotic pain medicines  Do not stop using this medicine suddenly. Your doctor will need to slowly decrease your dose before you stop it completely.  This medicine may make you dizzy or drowsy. Do not drive or do anything else that could be dangerous until you know how this medicine affects you.  Tell any doctor or dentist who treats you that you are using this medicine. This medicine may affect certain medical test results.  Your doctor will check your progress and the effects of this medicine at regular visits. Keep all appointments.  Keep all medicine out of the reach of children. Never share your medicine with anyone.  Possible Side Effects While Using This Medicine:   Call your doctor right away if you notice any of these side effects:  Allergic reaction: Itching or hives, swelling in your face or hands, swelling or tingling in your mouth or throat, chest tightness, trouble breathing  Behavior problems, aggression,  restlessness, trouble concentrating, moodiness (especially in children)  Blistering, peeling, red skin rash  Blue lips, fingernails, or skin, chest pain, fast heartbeat, trouble breathing  Change in how much or how often you urinate, bloody or cloudy urine  Dark urine or pale stools, nausea, vomiting, loss of appetite, stomach pain, yellow skin or eyes  Fever, chills, cough, sore throat, body aches  Problems with coordination, shakiness, unsteadiness, unusual eye movement  Rapid weight gain, swelling in your hands, ankles, or feet  Rash, swollen or tender glands in the neck, armpit, or groin  Unusual moods or behaviors, thoughts of hurting yourself, feeling depressed  If you notice these less serious side effects, talk with your doctor:   Dizziness, drowsiness, sleepiness, tiredness  If you notice other side effects that you think are caused by this medicine, tell your doctor.   Call your doctor for medical advice about side effects. You may report side effects to FDA at 9-382-FDA-8331  © Copyright [employer] 2023 Information is for End User's use only and may not be sold, redistributed or otherwise used for commercial purposes.  The above information is an  only. It is not intended as medical advice for individual conditions or treatments. Talk to your doctor, nurse or pharmacist before following any medical regimen to see if it is safe and effective for you.

## 2024-01-29 PROBLEM — M54.16 LUMBAR RADICULOPATHY: Status: ACTIVE | Noted: 2024-01-29

## 2024-01-29 NOTE — PROGRESS NOTES
Assessment  1. Lumbar radiculopathy  -     CT lumbar spine without contrast; Future; Expected date: 01/25/2024  -     gabapentin (NEURONTIN) 300 mg capsule; Take 1 capsule (300 mg total) by mouth 3 (three) times a day  -     Ambulatory referral to Physical Therapy; Future    2. Thrombosis of aorta (HCC)    3. Stage 3b chronic kidney disease (HCC)    Left-sided lumbar radicular pain in the L5 dermatomal distribution accompanied by pain limited weakness numbness and paresthesias.  Patient has not yet participated with PT. Chronic pain with decreased participation with IADLs over the past few months.  Has been taking steroids and tylenol in addition to low dose gabapentin  with modest benefit.  5/5 strength bilaterally, positive SLR left-sided. Reflexes 2+.  Additionally there is positive facet loading, left greater than right. Endorses gait instability, denies saddle anesthesia. On xray imaging Multilevel disc space narrowing with endplate degenerative changes and lower lumbar facet arthropathy. Grade 1 anterolisthesis of L5 on S1. Advanced degenerative changes of the bilateral hips.  Risks, benefits alternatives epidural steroid injections thoroughly discussed with patient.  Handouts provided questions answered to patient's satisfaction.  Lifestyle modifications extensively discussed including diet, exercise and weight loss in addition to core strengthening and smoking cessation.  Will proceed with multimodal pain therapy plan as noted below:    Additionally with left sided hip pain described primarily by arthritic features.  Aching, nagging, indolent, stabbing, throbbing features in left hip radiating to left groin.  5/5 strength bilaterally, negative SLR. Pain with internal and external rotation of left hip, ttp over left GTB. Advanced degenerative change noted on xray.  Currently she is neurologically intact without saddle anesthesia or bowel/bladder abnormality. Recent injection here with orthopedics did not  provide significant benefit. Handouts provided questions answered to patient satisfaction.      Plan  -CT lumbar spine; will f/u result with patient  -gabapentin increased to 300 mg t.i.d. Ordered for patient; counseled regarding sedative effects of taking this medication and provided up titration calendar.  Counseled not to take medication while driving or operating heavy machinery/using stairs  -Meloxicam 7.5 mg b.i.d. prn pain prescribed.  Patient educated regarding bleeding risk of taking this medication as well as not taking any other nonsteroidal anti-inflammatory medications while taking this medication; counseled thoroughly regarding potential risk of Cardiovascular injury, Kidney injury, Gastrointestinal ulceration/bleeding. Patient voiced understanding  -script provided for formal physical therapy for lumbar radiculopathy; Physician directed home exercise plan as per [facility] demonstrated and handouts provided that patient plans to participate with for 1 hour, twice a week for the next 6 weeks.     There are risks associated with opioid medications, including dependence, addiction and tolerance. The patient understands and agrees to use these medications only as prescribed. Potential side effects of the medications include, but are not limited to, constipation, drowsiness, addiction, impaired judgment and risk of fatal overdose if not taken as prescribed. The patient was warned against driving while taking sedation medications or operating heavy machinery. The patient voiced understanding. Sharing medications is a felony. At this point in time, the patient is showing no signs of addiction, abuse, diversion or suicidal ideation.     [place] Prescription Drug Monitoring Program report was reviewed and was appropriate      Complete risks and benefits including bleeding, infection, tissue reaction, nerve injury and allergic reaction were discussed. The approach was demonstrated using models and literature was  provided. Verbal and written consent was obtained.     My impressions and treatment recommendations were discussed in detail with the patient who verbalized understanding and had no further questions.  Discharge instructions were provided. I personally saw and examined the patient and I agree with the above discussed plan of care.    New Medications Ordered This Visit   Medications    glucose blood (FreeStyle Precision Guillaume Test) test strip     Sig: Use 1 strip    amLODIPine (NORVASC) 5 mg tablet    buPROPion (WELLBUTRIN SR) 150 mg 12 hr tablet     Sig: Take 150 mg by mouth every 12 (twelve) hours    gabapentin (NEURONTIN) 300 mg capsule     Sig: Take 1 capsule (300 mg total) by mouth 3 (three) times a day     Dispense:  90 capsule     Refill:  2       History of Present Illness    Mirna Medina is a 73 y.o. female with pmhx of COPD, HTN, GERD, CAD, presenting with chronic lumbar radicular pain in the left L5 dermatomal distribution. Debilitating pain limited weakness numbness and paresthesias accompany the pain. The patient rates the pain at a 8/10 accompanied by electric shock-like shooting features and crampy burning pain in the aforementioned dermatomal distributions.  The pain is worse in the mornings as well as the end of the day; exertion such as walking for long periods of time seems to exacerbate the pain.  The patient can hardly walk more than a few blocks without having debilitating pain.  She tries to maintain an active lifestyle and finds that the current degree of pain seems to compromises her efforts.  The pain significantly impacts independent activities of daily living and contributes to significant disability.  She has attempted physical therapy with exacerbation of the pain and inability to engage in further exercises.  She has taken steroids, tylenol as well as gabapentin with limited relief of the pain as well. She has never tried epidural steroid injections in the past. She denies any bowel or  bladder dysfunction/incontinence, saddle anesthesia, but endorses gait instability.    I have personally reviewed and/or updated the patient's past medical history, past surgical history, family history, social history, current medications, allergies, and vital signs today.     Review of Systems   Constitutional:  Positive for activity change.   HENT: Negative.     Eyes: Negative.    Respiratory: Negative.     Cardiovascular: Negative.    Gastrointestinal: Negative.    Endocrine: Negative.    Genitourinary: Negative.    Musculoskeletal:  Positive for arthralgias, back pain, gait problem and myalgias.   Skin: Negative.    Allergic/Immunologic: Negative.    Neurological:  Positive for weakness and numbness.   Hematological: Negative.    Psychiatric/Behavioral: Negative.     All other systems reviewed and are negative.      Patient Active Problem List   Diagnosis    Anxiety    Chronic diastolic congestive heart failure (HCC)    Coronary arteriosclerosis    Depression    Diabetes mellitus type 2, insulin dependent (HCC)    Disorder of magnesium metabolism    Dyslipidemia    Essential hypertension    GERD (gastroesophageal reflux disease)    Hypothyroidism    PAOD (peripheral arterial occlusive disease) (HCC)    Tobacco dependence due to cigarettes    Urinary incontinence    Acute hypoxic respiratory failure (HCC)    RSV bronchitis    HUSAM (acute kidney injury) (HCC)    Sepsis with acute hypoxic respiratory failure without septic shock (HCC)    Nonrheumatic aortic valve stenosis    COPD exacerbation (HCC)    Thrombosis of aorta (HCC)    Stage 3b chronic kidney disease (HCC)    Lumbar radiculopathy       Past Medical History:   Diagnosis Date    CHF (congestive heart failure) (HCC)     Coronary artery disease     Diabetes mellitus (HCC)     Disease of thyroid gland        Past Surgical History:   Procedure Laterality Date    APPENDECTOMY      CARDIAC SURGERY      CORONARY ANGIOPLASTY WITH STENT PLACEMENT      HERNIA REPAIR          History reviewed. No pertinent family history.    Social History     Occupational History    Not on file   Tobacco Use    Smoking status: Every Day     Current packs/day: 0.25     Types: Cigarettes    Smokeless tobacco: Never   Vaping Use    Vaping status: Never Used   Substance and Sexual Activity    Alcohol use: Not Currently    Drug use: Yes     Types: Marijuana    Sexual activity: Not on file       Current Outpatient Medications on File Prior to Visit   Medication Sig    acetaminophen (TYLENOL) 325 mg tablet Take 2 tablets (650 mg total) by mouth every 6 (six) hours as needed for mild pain, fever or headaches    amLODIPine (NORVASC) 5 mg tablet     aspirin (ECOTRIN LOW STRENGTH) 81 mg EC tablet Take 81 mg by mouth daily    atorvastatin (LIPITOR) 80 mg tablet Take 80 mg by mouth daily at bedtime    buPROPion (WELLBUTRIN SR) 150 mg 12 hr tablet Take 150 mg by mouth every 12 (twelve) hours    canagliflozin (Invokana) 100 mg     Cholecalciferol 50 MCG (2000 UT) CAPS Take 2,000 Units by mouth daily    clopidogrel (PLAVIX) 75 mg tablet Take 75 mg by mouth daily    diphenhydrAMINE (BENADRYL) 25 mg tablet Take 1 tablet (25 mg total) by mouth every 6 (six) hours as needed for itching    escitalopram (LEXAPRO) 20 mg tablet Take 20 mg by mouth daily    fenofibrate (TRICOR) 48 mg tablet     finasteride (PROSCAR) 5 mg tablet Take 5 mg by mouth daily    glucose blood (FreeStyle Precision Guillaume Test) test strip Use 1 strip    insulin aspart (NovoLOG FlexPen) 100 UNIT/ML injection pen 3 (three) times a day    insulin glargine (LANTUS SOLOSTAR) 100 units/mL injection pen Inject 48 Units under the skin daily at bedtime    ipratropium (ATROVENT) 0.02 % nebulizer solution Take 2.5 mL (0.5 mg total) by nebulization 3 (three) times a day    levalbuterol (XOPENEX) 1.25 mg/3 mL nebulizer solution Take 3 mL (1.25 mg total) by nebulization 3 (three) times a day    levothyroxine 150 mcg tablet     lidocaine (Lidoderm) 5 % Apply 1  patch topically over 12 hours daily Remove & Discard patch within 12 hours or as directed by MD    lisinopril (ZESTRIL) 20 mg tablet Take 20 mg by mouth daily    magnesium 30 MG tablet Take 71.5 mg by mouth 2 (two) times a day 71.5mg OTC - magnesium chloride (slow Mag)    magnesium chloride-calcium (SLOW-MAG) 71.5-119 mg Take 143 mg by mouth daily    Magnesium Oxide 500 MG TABS Take 1,000 mg by mouth 3 (three) times a day    metoprolol tartrate (LOPRESSOR) 25 mg tablet Take 25 mg by mouth 2 (two) times a day    Multiple Vitamins-Minerals (Multivitamin Women) TABS Take 1 tablet by mouth every morning    omeprazole (PriLOSEC OTC) 20 MG tablet Take 20 mg by mouth daily    predniSONE 10 mg tablet Take 5 tablets (50 mg total) by mouth daily Do not start before January 12, 2024.    sertraline (ZOLOFT) 50 mg tablet     Sodium Caprylate POWD Take by mouth    levothyroxine 125 mcg tablet Take 150 mcg by mouth daily    lidocaine (Lidoderm) 5 % Apply 1 patch topically over 12 hours daily for 15 days Remove & Discard patch within 12 hours or as directed by MD    Omega-3 Fatty Acids (fish oil) 1,000 mg Take 2 g by mouth 2 (two) times a day    ondansetron (ZOFRAN) 4 mg tablet Take 1 tablet (4 mg total) by mouth every 6 (six) hours     No current facility-administered medications on file prior to visit.       Allergies   Allergen Reactions    Bupropion GI Intolerance    Varenicline Other (See Comments)     No appetite, nauseated, no energy    Ibuprofen Other (See Comments)     Does not combine with medicine she takes    Liraglutide GI Intolerance    Medical Tape Other (See Comments)     itchy and red    Wound Dressing Adhesive Itching    Keflex [Cephalexin] Rash    Latex Rash       Physical Exam    /82 (BP Location: Left arm, Patient Position: Sitting, Cuff Size: Adult)   Pulse 65   Temp (!) 97 °F (36.1 °C)   Ht 5' (1.524 m)   Wt 70.8 kg (156 lb)   SpO2 96%   BMI 30.47 kg/m²     Constitutional: normal, well developed,  well nourished, alert, in no distress and non-toxic and no overt pain behavior. and overweight  Eyes: anicteric  HEENT: grossly intact  Neck: supple, symmetric, trachea midline and no masses   Pulmonary:even and unlabored  Cardiovascular:No edema or pitting edema present  Skin:Normal without rashes or lesions and well hydrated  Psychiatric:Mood and affect appropriate  Neurologic:Cranial Nerves II-XII grossly intact Sensation grossly intact; no clonus negative espitia's. Reflexes 2+ and brisk. SLR positive left sided  Musculoskeletal:normal gait. 5/5 strength bilaterally with AROM in lower extremities. Unable to perform normal heel toe and tip toe walking. Significant pain with lumbar facet loading bilaterally and with lateral spine rotation. ttp over lumbar paraspinal muscles. Negative chance's test, negative gaenslen's negative SIJ loading bilaterally. Ttp over left gtb, pain with internal and external rotation of left hip    Imaging    LUMBAR SPINE     INDICATION:   pain. Chronic left hip pain with difficulty ambulating     COMPARISON: Radiographs of the lumbar spine January 3, 2024     VIEWS:  XR SPINE LUMBAR 2 OR 3 VIEWS INJURY  Images: 3     FINDINGS:     There are 5 non rib bearing lumbar vertebral bodies.     There is no evidence of acute fracture or destructive osseous lesion.     Mild scoliotic deformity is again noted.  Alignment is otherwise unremarkable.     Multilevel disc space narrowing with endplate degenerative changes and lower lumbar facet arthropathy. Grade 1 anterolisthesis of L5 on S1. Advanced degenerative changes of the bilateral hips. No change from prior study.     The pedicles appear intact.     Ventral hernia mesh repair. Significant atherosclerotic calcifications. Proximal celiac and SMA stents.     IMPRESSION:     No acute osseous abnormality. Degenerative changes stable since prior exam.     Resident: DANA TOLENTINO I, the attending radiologist, have reviewed the images and agree  with the final report above.

## 2024-02-05 ENCOUNTER — OFFICE VISIT (OUTPATIENT)
Dept: PHYSICAL THERAPY | Facility: CLINIC | Age: 74
End: 2024-02-05
Payer: MEDICARE

## 2024-02-05 DIAGNOSIS — R29.818 NEUROGENIC CLAUDICATION: ICD-10-CM

## 2024-02-05 DIAGNOSIS — M70.62 GREATER TROCHANTERIC BURSITIS OF LEFT HIP: Primary | ICD-10-CM

## 2024-02-05 DIAGNOSIS — M25.552 LEFT HIP PAIN: ICD-10-CM

## 2024-02-05 PROCEDURE — 97140 MANUAL THERAPY 1/> REGIONS: CPT

## 2024-02-05 PROCEDURE — 97110 THERAPEUTIC EXERCISES: CPT

## 2024-02-05 NOTE — PROGRESS NOTES
Daily Note     Today's date: 2024  Patient name: Mirna Medina  : 1950  MRN: [id number]  Referring provider: Richard Bryant DO  Dx:   Encounter Diagnosis     ICD-10-CM    1. Greater trochanteric bursitis of left hip  M70.62       2. Left hip pain  M25.552       3. Neurogenic claudication  R29.818           Start Time: 1301  Stop Time: 1350  Total time in clinic (min): 49 minutes    Subjective: Reports she still is having severe pain in L hip and Lumbar spine. Gets sharp shots of pain that occur randomly. Has trouble getting comfortable and pain worsens c/ standing and walking ultimately resulting in buckling of right hip due to pain. Reports pain intensity overall is better since she was in the ER, but has been taking a lot of OTC pain relievers and gabapentin prescribed by pain management.       Objective: See treatment diary below      Assessment: Tolerated treatment poor. Demonstrates minimal tolerance to mobility and pain occurs in any position. Repeatedly recommended use of RW for safety and stability at home to prevent falls from buckling of right hip. Patient refused use of RW and required mod-maxA at times to remain upright due to pain, PT assisted c/ car transfer as well, maxA. Slight reduction of pain c/ STM to L side. Will continue to benefit from skilled PT services.       Plan: Continue per plan of care.      Precautions: Fall Risk    Daily Treatment Diary:      Initial Evaluation Date: 24  POC Expiration: 24  Compliance 24                   Visit Number 1 2                   Re-Eval  IE                 MC   Foto Captured Y                         Date       Manuals        STM to L Paraspinals KS KS      L/S Manual Traction KS                       Neuro Re-Ed        PPT        PPT / March        C/L UE/LE Isometric        Hook-lying Clams        S/Lying Clams        Side-stepping        HALEY        Pallof Press        Bridge        Bird-dog        Deadbug                 Ther Ex        LTRs        SKTC        Piriformis Stretch        HS Stretch        Hip Flexor Stretch        Leg Press        Seated Flexion c/ SB x10ea X10ea + Table Slides x10ea      Standing Hand/Heel Rock @ Counter        Standing: Ext/Abd        Bike        Education  Safety, recommend use of RW                      Ther Activity        Chop/Lift        Box Lift        Wall Squat        Lunge        Sit to Stand                        Modalities

## 2024-02-07 ENCOUNTER — OFFICE VISIT (OUTPATIENT)
Dept: PHYSICAL THERAPY | Facility: CLINIC | Age: 74
End: 2024-02-07
Payer: MEDICARE

## 2024-02-07 DIAGNOSIS — M70.62 GREATER TROCHANTERIC BURSITIS OF LEFT HIP: Primary | ICD-10-CM

## 2024-02-07 DIAGNOSIS — M25.552 LEFT HIP PAIN: ICD-10-CM

## 2024-02-07 DIAGNOSIS — R29.818 NEUROGENIC CLAUDICATION: ICD-10-CM

## 2024-02-07 PROCEDURE — 97110 THERAPEUTIC EXERCISES: CPT

## 2024-02-07 PROCEDURE — 97530 THERAPEUTIC ACTIVITIES: CPT

## 2024-02-07 PROCEDURE — 97140 MANUAL THERAPY 1/> REGIONS: CPT

## 2024-02-07 NOTE — PROGRESS NOTES
Daily Note     Today's date: 2024  Patient name: Mirna Medina  : 1950  MRN: [id number]  Referring provider: Richard Bryant DO  Dx:   Encounter Diagnosis     ICD-10-CM    1. Greater trochanteric bursitis of left hip  M70.62       2. Left hip pain  M25.552       3. Neurogenic claudication  R29.818           Start Time: 1415  Stop Time: 1500  Total time in clinic (min): 45 minutes    Subjective: Reports the edge has been taken off slightly since last visit. Also feels like walking is going better. Reports she had sudden pain and she lowered herself onto her knees at home earlier today. No change or increase in present or other pain. CT scan scheduled for Saturday.       Objective: See treatment diary below      Assessment: Tolerated treatment fair. Trialed exercises in hook-lying c/ bolster under knees in comfortable position. Tolerated for a few minutes prior onset of pain L hip only slights relieved by stead position leaning on R side and offloading L. Patient has tendency to move impulsively with pain resulting in decreased safety c/ functional mobility and ambulation. Continued to educate patient regarding need for use of RW c/ all functional mobility at home. Ambulated to her vehicle c/ trial of clinic's RW. Patient much safer and able to lean on walker during moments of pain. PT remained CGA/Yakelin for all ambulation c/ RW and Yakelin-maxA for ambulation s/ AD. Patient verbalized understanding of recommendation for RW use. Patient would benefit from continued PT.      Plan: Continue per plan of care.      Precautions: Fall Risk    Daily Treatment Diary:      Initial Evaluation Date: 24  POC Expiration: 24  Compliance 24                 Visit Number 1 2  3                 Re-Eval  IE                 MC   Foto Captured Y                         Date      Manuals        STM to L Paraspinals KS KS KS     L/S Manual Traction KS                       Neuro Re-Ed       "  PPT        PPT c/ March        C/L UE/LE Isometric        Hook-lying Clams        S/Lying Clams        Side-stepping        HALEY        Pallof Press        Bridge        Bird-dog        Deadbug                Ther Ex        Abdominal Iso c/ Ball   5\"/10 stopped 2* p!     LTRs        SKTC   10\"/5     Piriformis Stretch        HS Stretch        Hip Flexor Stretch        Leg Press        Seated Flexion c/ SB x10ea X10ea + Table Slides x10ea X10ea + Table Slides x10ea     Standing Hand/Heel Rock @ Counter        Standing: Ext/Abd        Bike        Education  Safety, recommend use of RW Safety, recommended use of RW                     Ther Activity        Chop/Lift        Box Lift        Wall Squat        Lunge        Sit to Stand        Ambulation to Car c/ Curb Step and Car Transfer c/ RW   x3 standing rest breaks 2* p!, slow             Modalities                                    "

## 2024-02-09 ENCOUNTER — TELEPHONE (OUTPATIENT)
Age: 74
End: 2024-02-09

## 2024-02-09 NOTE — TELEPHONE ENCOUNTER
"Fyi  Pt of [facility] had half day today    S/w pt again to f/u. Pt now states she has been using ice for the pain since we spoke and she feels a little better. States \"the tears are gone.\"  Took her BP again while I was on the phone with her and got 169/97. Advised pt if pain worsens or BP increases, or any s/s of HTN, that she have someone take her to ED this evening. Advised she reach out to her PCP as well to make aware of elevated BPs. Pt is on multiple BP meds and states she skipped her afternoon pills due to pain and not having eaten. Pt appreciative of cb. Aware of CT lumbar spine tomorrow and that [facility] will cb with next step in treatment plan once results received.  "

## 2024-02-09 NOTE — TELEPHONE ENCOUNTER
Caller: lawrence Bradley    Doctor: Atilio    Reason for call: pt stated she in a lot of pain and the pain patches are not working. Pt  would like to know if Dr. Trimble can recommend something for the pain.    Call back#: [phone]

## 2024-02-09 NOTE — TELEPHONE ENCOUNTER
Caller: Patient    Doctor/Office: Atilio    Call regarding :   pain    Call was transferred to: spa

## 2024-02-09 NOTE — TELEPHONE ENCOUNTER
"S/w pt. C/o severe left sided pain and is crying. Pt states her BP was 180's/113 today. Advised pt to go to ED due to severe pain and HTN now. Pt states \"can't I get something to just help me lay down?\" Advised ED is most appropriate place for her at this time. Pt refusing and asking for something for pain. Advised will make VS aware but he will advise same.    Pt is taking tylenol, gabapentin, lidocaine patch and arthritis cream without relief. Does not tolerate NSAIDs due to plavix.  " hot cocoa

## 2024-02-10 ENCOUNTER — HOSPITAL ENCOUNTER (OUTPATIENT)
Dept: CT IMAGING | Facility: HOSPITAL | Age: 74
Discharge: HOME | End: 2024-02-10
Attending: ANESTHESIOLOGY
Payer: MEDICARE

## 2024-02-10 DIAGNOSIS — M54.16 LUMBAR RADICULOPATHY: ICD-10-CM

## 2024-02-10 PROCEDURE — 72131 CT LUMBAR SPINE W/O DYE: CPT

## 2024-02-12 ENCOUNTER — OFFICE VISIT (OUTPATIENT)
Dept: PHYSICAL THERAPY | Facility: CLINIC | Age: 74
End: 2024-02-12
Payer: MEDICARE

## 2024-02-12 ENCOUNTER — APPOINTMENT (OUTPATIENT)
Dept: PHYSICAL THERAPY | Facility: CLINIC | Age: 74
End: 2024-02-12
Payer: MEDICARE

## 2024-02-12 DIAGNOSIS — M70.62 GREATER TROCHANTERIC BURSITIS OF LEFT HIP: Primary | ICD-10-CM

## 2024-02-12 DIAGNOSIS — R29.818 NEUROGENIC CLAUDICATION: ICD-10-CM

## 2024-02-12 DIAGNOSIS — M25.552 LEFT HIP PAIN: ICD-10-CM

## 2024-02-12 PROCEDURE — 97140 MANUAL THERAPY 1/> REGIONS: CPT

## 2024-02-12 PROCEDURE — 97110 THERAPEUTIC EXERCISES: CPT

## 2024-02-12 PROCEDURE — 97112 NEUROMUSCULAR REEDUCATION: CPT

## 2024-02-12 NOTE — TELEPHONE ENCOUNTER
Rudy Trimble MD  You31 minutes ago (10:13 AM)       Just waiting for her CT scan to be read, then I'll give her a call with results and next steps

## 2024-02-12 NOTE — PROGRESS NOTES
"Daily Note     Today's date: 2024  Patient name: Mirna Medina  : 1950  MRN: [id number]  Referring provider: Richard Bryant DO  Dx:   Encounter Diagnosis     ICD-10-CM    1. Greater trochanteric bursitis of left hip  M70.62       2. Left hip pain  M25.552       3. Neurogenic claudication  R29.818           Start Time: 1111  Stop Time: 1155  Total time in clinic (min): 44 minutes    Subjective: Was able to get a good night's sleep last night. Had CT scan on Saturday, was not contacted c/ results yet.       Objective: See treatment diary below      Assessment: Tolerated treatment fair. Patient demonstrated fatigue post treatment, exhibited good technique with therapeutic exercises, and would benefit from continued PT. Has only x1 sudden pain c/ exercises and able to incorporate additional stretching and stability exercises today. Improved gait mechanics, though still recommend RW for all ambulation as she has tendency for impulsive movements when gets sudden shot of pain.       Plan: Continue per plan of care.      Precautions: Fall Risk    Daily Treatment Diary:      Initial Evaluation Date: 24  POC Expiration: 24  Compliance 24               Visit Number 1 2  3  4               Re-Eval  IE                 MC   Foto Captured Y                         Date     Manuals        STM to L Paraspinals KS KS KS KS    L/S Manual Traction KS                       Neuro Re-Ed        PPT        PPT / March        C/L UE/LE Isometric        BKFO    x10ea    Hip ADDuction iso c/ ball    3\"    Side-stepping        HALEY        Pallof Press        Bridge        Bird-dog        Deadbug                Ther Ex        Abdominal Iso c/ Ball   5\"/10 stopped 2* p! 5\"/10     SKOTC    10\"/10    SKTC   10\" 10\"/10    Piriformis Stretch        HS Stretch        Hip Flexor Stretch        Leg Press        Seated Flexion c/ SB x10ea X10ea + Table Slides x10ea X10ea + Table Slides " x10ea X10ea Table Slides    Standing Hand/Heel Rock @ Counter        Standing: Ext/Abd        Bike        Education  Safety, recommend use of RW Safety, recommended use of RW                     Ther Activity        Chop/Lift        Box Lift        Wall Squat        Lunge        Sit to Stand        Ambulation to Car c/ Curb Step and Car Transfer c/ RW   x3 standing rest breaks 2* p!, slow Used RW, no standing rest breaks rqd            Modalities

## 2024-02-13 ENCOUNTER — APPOINTMENT (OUTPATIENT)
Dept: PHYSICAL THERAPY | Facility: CLINIC | Age: 74
End: 2024-02-13
Payer: MEDICARE

## 2024-02-14 ENCOUNTER — OFFICE VISIT (OUTPATIENT)
Dept: PHYSICAL THERAPY | Facility: CLINIC | Age: 74
End: 2024-02-14
Payer: MEDICARE

## 2024-02-14 DIAGNOSIS — M25.552 LEFT HIP PAIN: ICD-10-CM

## 2024-02-14 DIAGNOSIS — M70.62 GREATER TROCHANTERIC BURSITIS OF LEFT HIP: Primary | ICD-10-CM

## 2024-02-14 DIAGNOSIS — R29.818 NEUROGENIC CLAUDICATION: ICD-10-CM

## 2024-02-14 PROCEDURE — 97110 THERAPEUTIC EXERCISES: CPT

## 2024-02-14 PROCEDURE — 97112 NEUROMUSCULAR REEDUCATION: CPT

## 2024-02-14 PROCEDURE — 97140 MANUAL THERAPY 1/> REGIONS: CPT

## 2024-02-14 NOTE — PROGRESS NOTES
"Daily Note     Today's date: 2024  Patient name: Mirna Medina  : 1950  MRN: [id number]  Referring provider: Richard Bryant DO  Dx:   Encounter Diagnosis     ICD-10-CM    1. Greater trochanteric bursitis of left hip  M70.62       2. Left hip pain  M25.552       3. Neurogenic claudication  R29.818           Start Time: 1302  Stop Time: 1345  Total time in clinic (min): 43 minutes    Subjective: Feeling better overall, still very limited in overall functional mobility but has been able to stand and walk for slightly longer without leaning over and increasing pain.      Objective: See treatment diary below      Assessment: Tolerated treatment well. Patient demonstrated fatigue post treatment, exhibited good technique with therapeutic exercises, and would benefit from continued PT. Able to introduce hook-lying exercises c/ slight resistance without pain. Continue to utilize RW for ambulation to vehicle for safety, continued to urge patient to use RW when symptoms require it. She verbalized understanding but does not want to use RW.       Plan: Continue per plan of care.      Precautions: Fall Risk    Daily Treatment Diary:      Initial Evaluation Date: 24  POC Expiration: 24  Compliance 24             Visit Number 1 2  3  4  5             Re-Eval  IE                 MC   Foto Captured Y                         Date    Manuals        STM to L Paraspinals KS KS KS KS KS   L/S Manual Traction KS                       Neuro Re-Ed        PPT        PPT / March        C/L UE/LE Isometric        BKFO    x10ea x10ea   Hip ADDuction iso c/ ball    3\" 3\"   Hook-lying Clams     RTB x20ea   Side-stepping        HALEY        Pallof Press        Bridge        Bird-dog        Deadbug                Ther Ex        Abdominal Iso c/ Ball   5\"/10 stopped 2* p! 5\"/10  5\"/10   SKOTC    10\"/10 10\"/10   SKTC   10\" 10\"/10 10\"/10   Piriformis Stretch        HS " Stretch        Hip Flexor Stretch        Leg Press        Seated Flexion c/ SB x10ea X10ea + Table Slides x10ea X10ea + Table Slides x10ea X10ea Table Slides X10ea Table Slides   Standing Hand/Heel Rock @ Counter        Standing: Ext/Abd        Bike        Education  Safety, recommend use of RW Safety, recommended use of RW                     Ther Activity        Chop/Lift        Box Lift        Wall Squat        Lunge        Sit to Stand        Ambulation to Car c/ Curb Step and Car Transfer c/ RW   x3 standing rest breaks 2* p!, slow Used RW, no standing rest breaks rqd Used RW, no standing rest breaks rqd           Modalities

## 2024-02-17 ENCOUNTER — NURSE TRIAGE (OUTPATIENT)
Dept: OTHER | Facility: OTHER | Age: 74
End: 2024-02-17

## 2024-02-17 NOTE — TELEPHONE ENCOUNTER
"Reason for Disposition  • [1] SEVERE pain (e.g., excruciating, unable to do any normal activities) AND [2] not improved after 2 hours of pain medicine    Answer Assessment - Initial Assessment Questions  1. LOCATION and RADIATION: \"Where is the pain located?\"       Left hip pain started   2. QUALITY: \"What does the pain feel like?\"  (e.g., sharp, dull, aching, burning)    3. SEVERITY: \"How bad is the pain?\" \"What does it keep you from doing?\"   (Scale 1-10; or mild, moderate, severe)  Severe patient state \" the pain is so bad that I am throwing up\"     6. CAUSE: \"What do you think is causing the hip pain?\"   Unknown   7. AGGRAVATING FACTORS: \"What makes the hip pain worse?\" (e.g., walking, climbing stairs, running)  Moving, sitting laying   8. OTHER SYMPTOMS: \"Do you have any other symptoms?\" (e.g., back pain, pain shooting down leg,  fever, rash)   Throwing up    Protocols used: Hip Pain-ADULT-AH    "

## 2024-02-17 NOTE — TELEPHONE ENCOUNTER
"Regarding: Severe Left Hip pain.  ----- Message from Danica Denis sent at 2/17/2024  9:26 AM EST -----  \" I am having Severe Pain in my Left Hip, I can't take it any more.\"    "

## 2024-02-19 ENCOUNTER — APPOINTMENT (OUTPATIENT)
Dept: PHYSICAL THERAPY | Facility: CLINIC | Age: 74
End: 2024-02-19
Payer: MEDICARE

## 2024-02-21 PROBLEM — A41.9 SEPSIS WITH ACUTE HYPOXIC RESPIRATORY FAILURE WITHOUT SEPTIC SHOCK (HCC): Status: RESOLVED | Noted: 2024-01-08 | Resolved: 2024-02-21

## 2024-02-21 PROBLEM — J20.5 RSV BRONCHITIS: Status: RESOLVED | Noted: 2024-01-08 | Resolved: 2024-02-21

## 2024-02-21 PROBLEM — R65.20 SEPSIS WITH ACUTE HYPOXIC RESPIRATORY FAILURE WITHOUT SEPTIC SHOCK (HCC): Status: RESOLVED | Noted: 2024-01-08 | Resolved: 2024-02-21

## 2024-02-21 PROBLEM — J96.01 SEPSIS WITH ACUTE HYPOXIC RESPIRATORY FAILURE WITHOUT SEPTIC SHOCK (HCC): Status: RESOLVED | Noted: 2024-01-08 | Resolved: 2024-02-21

## 2024-02-22 ENCOUNTER — APPOINTMENT (OUTPATIENT)
Dept: PHYSICAL THERAPY | Facility: CLINIC | Age: 74
End: 2024-02-22
Payer: MEDICARE

## 2024-03-16 ENCOUNTER — OFFICE VISIT (OUTPATIENT)
Dept: URGENT CARE | Facility: CLINIC | Age: 74
End: 2024-03-16
Payer: MEDICARE

## 2024-03-16 VITALS
HEIGHT: 60 IN | HEART RATE: 71 BPM | WEIGHT: 151.6 LBS | BODY MASS INDEX: 29.76 KG/M2 | SYSTOLIC BLOOD PRESSURE: 138 MMHG | TEMPERATURE: 97.3 F | DIASTOLIC BLOOD PRESSURE: 88 MMHG | OXYGEN SATURATION: 97 % | RESPIRATION RATE: 18 BRPM

## 2024-03-16 DIAGNOSIS — R68.89 FLU-LIKE SYMPTOMS: Primary | ICD-10-CM

## 2024-03-16 PROCEDURE — 99213 OFFICE O/P EST LOW 20 MIN: CPT | Performed by: EMERGENCY MEDICINE

## 2024-03-16 PROCEDURE — G0463 HOSPITAL OUTPT CLINIC VISIT: HCPCS | Performed by: EMERGENCY MEDICINE

## 2024-03-16 RX ORDER — ONDANSETRON HYDROCHLORIDE 8 MG/1
8 TABLET, FILM COATED ORAL EVERY 8 HOURS PRN
Qty: 8 TABLET | Refills: 0 | Status: SHIPPED | OUTPATIENT
Start: 2024-03-16

## 2024-03-16 NOTE — PROGRESS NOTES
[facility]        NAME: Mirna Medina is a 73 y.o. female  : 1950    MRN: [id number]  DATE: 2024  TIME: 3:50 PM    Assessment and Plan   Flu-like symptoms [R68.89]  1. Flu-like symptoms  ondansetron (ZOFRAN) 8 mg tablet            Patient Instructions     Patient Instructions   You have been diagnosed with a Flu like illness and your symptoms should resolve over the next 7 to 10 days with the treatments recommended today.  If they do not, it is possible that you have developed a bacterial infection and you should return. If you were to take an antibiotic while you are still in the viral stage, you will not get better any faster, but could kill off good germs in your body as well as make germs resistant to the antibiotic.  Take an expectorant - guaifenesin should be the only ingredient - during the day, and the cough suppressant (ex. Robitussin DM or Tessalon) if needed at night only.   Take Zinc 25 mg every 12 hours for the next week (the dose is important so do not just take a multivitamin with zinc or an over-the-counter cold med with zinc such as Airborne or Zicam, as that will not give you the sufficient dose).  You should also take   You should also take vitamin D3 5000 i.u.s per day for the next 1 week, and vitamin-C 1000 mg twice daily (and again dosages are important, do not think you are getting enough vitamin C just by drinking extra orange juice).  May take Flonase as discussed.  You may also take a decongestant like Sudafed, unless you have hypertension or cardiac disease.  You may take Imodium for diarrhea according to package instructions.     If you are diabetic you should adhere strictly to your diabetic diet and monitor blood sugar closely while on prednisone and you should discontinue the prednisone if blood sugar becomes significantly elevated.  Avoid nonsteroidal anti-inflammatories like Advil or Aleve while on prednisone.       Flu-like illness   AMBULATORY CARE:    Flu-like illness is an infection caused by a virus. The flu is easily spread when an infected person coughs, sneezes, or has close contact with others. You may be able to spread the flu to others for 1 week or longer after signs or symptoms appear.   Common signs and symptoms include the following:   Fever and chills    Headaches, body aches, and muscle or joint pain    Cough, runny nose, and sore throat    Loss of appetite, nausea, vomiting, or diarrhea    Tiredness    Trouble breathing  Call 911 for any of the following:   You have trouble breathing, and your lips look purple or blue.    You have a seizure.  Seek care immediately if:   You are dizzy, or you are urinating less or not at all.     You have a headache with a stiff neck, and you feel tired or confused.    You have new pain or pressure in your chest.    Your symptoms, such as shortness of breath, vomiting, or diarrhea, get worse.     Your symptoms, such as fever and coughing, seem to get better, but then get worse.  Contact your healthcare provider if:   You have new muscle pain or weakness.    You have questions or concerns about your condition or care.  Treatment for influenza  may include any of the following:  Acetaminophen decreases pain and fever. It is available without a doctor's order. Ask how much to take and how often to take it. Follow directions. Acetaminophen can cause liver damage if not taken correctly.    NSAIDs  such as ibuprofen, help decrease swelling, pain, and fever. This medicine is available with or without a doctor's order. NSAIDs can cause stomach bleeding or kidney problems in certain people. If you take blood thinner medicine, always ask your healthcare provider if NSAIDs are safe for you. Always read the medicine label and follow directions.    Antivirals  help fight a viral infection.  Manage your symptoms:   Rest  as much as you can to help you recover.    Drink liquids as directed  to help prevent dehydration. Ask how  much liquid to drink each day and which liquids are best for you.  Prevent the spread of the flu:   Wash your hands often.  Use soap and water. Wash your hands after you use the bathroom, change a child's diapers, or sneeze. Wash your hands before you prepare or eat food. Use gel hand cleanser when soap and water are not available. Do not touch your eyes, nose, or mouth unless you have washed your hands first.       Cover your mouth when you sneeze or cough.  Cough into a tissue or the bend of your arm.    Clean shared items with a germ-killing .  Clean table surfaces, doorknobs, and light switches. Do not share towels, silverware, and dishes with people who are sick. Wash bed sheets, towels, silverware, and dishes with soap and water.     Wear a mask  over your mouth and nose if you are sick or are near anyone who is sick.     Stay away from others  if you are sick.     Follow up with your healthcare provider as directed:  Write down your questions so you remember to ask them during your visits.   © 2017 [employer] Information is for End User's use only and may not be sold, redistributed or otherwise used for commercial purposes. All illustrations and images included in CareNotes® are the copyrighted property of [employer] or [employer].  The above information is an  only. It is not intended as medical advice for individual conditions or treatments. Talk to your doctor, nurse or pharmacist before following any medical regimen to see if it is safe and effective for you.    Follow up with PCP in 3-5 days.  Proceed to  ER if symptoms worsen.    Chief Complaint     Chief Complaint   Patient presents with    Cold Like Symptoms     Chills, nausea, sweats and fatigue x 1 week          History of Present Illness       Patient complains of nausea, chills, sweats, fatigue, lightheadedness for the past week.        Review of Systems   Review of Systems   Constitutional:   Positive for chills and fatigue. Negative for appetite change and fever.   HENT:  Negative for congestion, rhinorrhea, sinus pressure, sore throat, trouble swallowing and voice change.    Respiratory:  Negative for cough, chest tightness, shortness of breath and wheezing.    Cardiovascular:  Negative for chest pain.   Gastrointestinal:  Positive for nausea. Negative for vomiting.   Musculoskeletal:  Negative for myalgias.   Neurological:  Positive for light-headedness.         Current Medications       Current Outpatient Medications:     acetaminophen (TYLENOL) 325 mg tablet, Take 2 tablets (650 mg total) by mouth every 6 (six) hours as needed for mild pain, fever or headaches, Disp: 30 tablet, Rfl: 0    amLODIPine (NORVASC) 5 mg tablet, , Disp: , Rfl:     aspirin (ECOTRIN LOW STRENGTH) 81 mg EC tablet, Take 81 mg by mouth daily, Disp: , Rfl:     atorvastatin (LIPITOR) 80 mg tablet, Take 80 mg by mouth daily at bedtime, Disp: , Rfl:     buPROPion (WELLBUTRIN SR) 150 mg 12 hr tablet, Take 150 mg by mouth every 12 (twelve) hours, Disp: , Rfl:     canagliflozin (Invokana) 100 mg, , Disp: , Rfl:     Cholecalciferol 50 MCG (2000 UT) CAPS, Take 2,000 Units by mouth daily, Disp: , Rfl:     clopidogrel (PLAVIX) 75 mg tablet, Take 75 mg by mouth daily, Disp: , Rfl:     diphenhydrAMINE (BENADRYL) 25 mg tablet, Take 1 tablet (25 mg total) by mouth every 6 (six) hours as needed for itching, Disp: 30 tablet, Rfl: 0    escitalopram (LEXAPRO) 20 mg tablet, Take 20 mg by mouth daily, Disp: , Rfl:     fenofibrate (TRICOR) 48 mg tablet, , Disp: , Rfl:     finasteride (PROSCAR) 5 mg tablet, Take 5 mg by mouth daily, Disp: , Rfl:     gabapentin (NEURONTIN) 300 mg capsule, Take 1 capsule (300 mg total) by mouth 3 (three) times a day, Disp: 90 capsule, Rfl: 2    glucose blood (FreeStyle Precision Guillaume Test) test strip, Use 1 strip, Disp: , Rfl:     insulin aspart (NovoLOG FlexPen) 100 UNIT/ML injection pen, 3 (three) times a day, Disp: ,  Rfl:     insulin glargine (LANTUS SOLOSTAR) 100 units/mL injection pen, Inject 48 Units under the skin daily at bedtime, Disp: , Rfl:     ipratropium (ATROVENT) 0.02 % nebulizer solution, Take 2.5 mL (0.5 mg total) by nebulization 3 (three) times a day, Disp: 150 mL, Rfl: 0    levalbuterol (XOPENEX) 1.25 mg/3 mL nebulizer solution, Take 3 mL (1.25 mg total) by nebulization 3 (three) times a day, Disp: 150 mL, Rfl: 0    levothyroxine 150 mcg tablet, , Disp: , Rfl:     lidocaine (Lidoderm) 5 %, Apply 1 patch topically over 12 hours daily Remove & Discard patch within 12 hours or as directed by MD, Disp: 6 patch, Rfl: 0    lisinopril (ZESTRIL) 20 mg tablet, Take 20 mg by mouth daily, Disp: , Rfl:     magnesium 30 MG tablet, Take 71.5 mg by mouth 2 (two) times a day 71.5mg OTC - magnesium chloride (slow Mag), Disp: , Rfl:     magnesium chloride-calcium (SLOW-MAG) 71.5-119 mg, Take 143 mg by mouth daily, Disp: , Rfl:     Magnesium Oxide 500 MG TABS, Take 1,000 mg by mouth 3 (three) times a day, Disp: , Rfl:     metoprolol tartrate (LOPRESSOR) 25 mg tablet, Take 25 mg by mouth 2 (two) times a day, Disp: , Rfl:     Multiple Vitamins-Minerals (Multivitamin Women) TABS, Take 1 tablet by mouth every morning, Disp: , Rfl:     Omega-3 Fatty Acids (fish oil) 1,000 mg, Take 2 g by mouth 2 (two) times a day, Disp: , Rfl:     omeprazole (PriLOSEC OTC) 20 MG tablet, Take 20 mg by mouth daily, Disp: , Rfl:     ondansetron (ZOFRAN) 4 mg tablet, Take 1 tablet (4 mg total) by mouth every 6 (six) hours, Disp: 12 tablet, Rfl: 0    ondansetron (ZOFRAN) 8 mg tablet, Take 1 tablet (8 mg total) by mouth every 8 (eight) hours as needed for nausea, Disp: 8 tablet, Rfl: 0    predniSONE 10 mg tablet, Take 5 tablets (50 mg total) by mouth daily Do not start before January 12, 2024., Disp: 5 tablet, Rfl: 0    sertraline (ZOLOFT) 50 mg tablet, , Disp: , Rfl:     Sodium Caprylate POWD, Take by mouth, Disp: , Rfl:     levothyroxine 125 mcg tablet,  Take 150 mcg by mouth daily, Disp: , Rfl:     lidocaine (Lidoderm) 5 %, Apply 1 patch topically over 12 hours daily for 15 days Remove & Discard patch within 12 hours or as directed by MD, Disp: 15 patch, Rfl: 0    Current Allergies     Allergies as of 03/16/2024 - Reviewed 03/16/2024   Allergen Reaction Noted    Bupropion GI Intolerance 01/20/2020    Varenicline Other (See Comments) 05/04/2023    Ibuprofen Other (See Comments) 09/12/2019    Liraglutide GI Intolerance 10/19/2020    Medical tape Other (See Comments) 04/03/2013    Wound dressing adhesive Itching 07/05/2021    Keflex [cephalexin] Rash 07/05/2021    Latex Rash 03/04/2015            The following portions of the patient's history were reviewed and updated as appropriate: allergies, current medications, past family history, past medical history, past social history, past surgical history and problem list.     Past Medical History:   Diagnosis Date    CHF (congestive heart failure) (HCC)     Coronary artery disease     Diabetes mellitus (HCC)     Disease of thyroid gland     Shoulder injury        Past Surgical History:   Procedure Laterality Date    APPENDECTOMY      CARDIAC SURGERY      CORONARY ANGIOPLASTY WITH STENT PLACEMENT      HERNIA REPAIR         History reviewed. No pertinent family history.      Medications have been verified.        Objective   /88   Pulse 71   Temp (!) 97.3 °F (36.3 °C) (Tympanic)   Resp 18   Ht 5' (1.524 m)   Wt 68.8 kg (151 lb 9.6 oz)   SpO2 97%   BMI 29.61 kg/m²        Physical Exam     Physical Exam  Vitals and nursing note reviewed.   Constitutional:       General: She is not in acute distress.     Appearance: She is well-developed. She is not ill-appearing, toxic-appearing or diaphoretic.   HENT:      Head: Normocephalic and atraumatic.      Right Ear: External ear normal.      Left Ear: External ear normal.      Nose: Mucosal edema and congestion present.      Mouth/Throat:      Pharynx: Posterior  oropharyngeal erythema present. No oropharyngeal exudate.      Tonsils: No tonsillar abscesses.   Cardiovascular:      Rate and Rhythm: Normal rate and regular rhythm.   Pulmonary:      Effort: Pulmonary effort is normal. No respiratory distress.      Breath sounds: No wheezing or rales.   Musculoskeletal:      Cervical back: Neck supple.   Skin:     General: Skin is warm and dry.   Neurological:      Mental Status: She is alert and oriented to person, place, and time.   Psychiatric:         Mood and Affect: Mood normal.         Behavior: Behavior normal.         Thought Content: Thought content normal.         Judgment: Judgment normal.

## 2024-03-16 NOTE — PATIENT INSTRUCTIONS
You have been diagnosed with a Flu like illness and your symptoms should resolve over the next 7 to 10 days with the treatments recommended today.  If they do not, it is possible that you have developed a bacterial infection and you should return. If you were to take an antibiotic while you are still in the viral stage, you will not get better any faster, but could kill off good germs in your body as well as make germs resistant to the antibiotic.  Take an expectorant - guaifenesin should be the only ingredient - during the day, and the cough suppressant (ex. Robitussin DM or Tessalon) if needed at night only.   Take Zinc 25 mg every 12 hours for the next week (the dose is important so do not just take a multivitamin with zinc or an over-the-counter cold med with zinc such as Airborne or Zicam, as that will not give you the sufficient dose).  You should also take   You should also take vitamin D3 5000 i.u.s per day for the next 1 week, and vitamin-C 1000 mg twice daily (and again dosages are important, do not think you are getting enough vitamin C just by drinking extra orange juice).  May take Flonase as discussed.  You may also take a decongestant like Sudafed, unless you have hypertension or cardiac disease.  You may take Imodium for diarrhea according to package instructions.     If you are diabetic you should adhere strictly to your diabetic diet and monitor blood sugar closely while on prednisone and you should discontinue the prednisone if blood sugar becomes significantly elevated.  Avoid nonsteroidal anti-inflammatories like Advil or Aleve while on prednisone.       Flu-like illness   AMBULATORY CARE:   Flu-like illness is an infection caused by a virus. The flu is easily spread when an infected person coughs, sneezes, or has close contact with others. You may be able to spread the flu to others for 1 week or longer after signs or symptoms appear.   Common signs and symptoms include the following:   Fever  and chills    Headaches, body aches, and muscle or joint pain    Cough, runny nose, and sore throat    Loss of appetite, nausea, vomiting, or diarrhea    Tiredness    Trouble breathing  Call 911 for any of the following:   You have trouble breathing, and your lips look purple or blue.    You have a seizure.  Seek care immediately if:   You are dizzy, or you are urinating less or not at all.     You have a headache with a stiff neck, and you feel tired or confused.    You have new pain or pressure in your chest.    Your symptoms, such as shortness of breath, vomiting, or diarrhea, get worse.     Your symptoms, such as fever and coughing, seem to get better, but then get worse.  Contact your healthcare provider if:   You have new muscle pain or weakness.    You have questions or concerns about your condition or care.  Treatment for influenza  may include any of the following:  Acetaminophen decreases pain and fever. It is available without a doctor's order. Ask how much to take and how often to take it. Follow directions. Acetaminophen can cause liver damage if not taken correctly.    NSAIDs  such as ibuprofen, help decrease swelling, pain, and fever. This medicine is available with or without a doctor's order. NSAIDs can cause stomach bleeding or kidney problems in certain people. If you take blood thinner medicine, always ask your healthcare provider if NSAIDs are safe for you. Always read the medicine label and follow directions.    Antivirals  help fight a viral infection.  Manage your symptoms:   Rest  as much as you can to help you recover.    Drink liquids as directed  to help prevent dehydration. Ask how much liquid to drink each day and which liquids are best for you.  Prevent the spread of the flu:   Wash your hands often.  Use soap and water. Wash your hands after you use the bathroom, change a child's diapers, or sneeze. Wash your hands before you prepare or eat food. Use gel hand cleanser when soap and  water are not available. Do not touch your eyes, nose, or mouth unless you have washed your hands first.       Cover your mouth when you sneeze or cough.  Cough into a tissue or the bend of your arm.    Clean shared items with a germ-killing .  Clean table surfaces, doorknobs, and light switches. Do not share towels, silverware, and dishes with people who are sick. Wash bed sheets, towels, silverware, and dishes with soap and water.     Wear a mask  over your mouth and nose if you are sick or are near anyone who is sick.     Stay away from others  if you are sick.     Follow up with your healthcare provider as directed:  Write down your questions so you remember to ask them during your visits.   © 2017 [employer] Information is for End User's use only and may not be sold, redistributed or otherwise used for commercial purposes. All illustrations and images included in CareNotes® are the copyrighted property of [employer] or [employer].  The above information is an  only. It is not intended as medical advice for individual conditions or treatments. Talk to your doctor, nurse or pharmacist before following any medical regimen to see if it is safe and effective for you.

## 2024-05-11 NOTE — ED NOTES
Pt transported to CT via liter     Cruz Joel RN  07/05/21 3749 You presented with concern for an episode where you became unresponsive.  You initially arrived as a stroke alert but had no new focal deficits found in your CT head did not show any acute findings.  We did complete labs while here which showed evidence of an acute kidney injury, given that you have had recent vomiting is likely due to dehydration and you were treated with IV fluids.  You were monitored in the department without any additional concerning episodes and are stable for discharge home.  If you have any concerns please return to the emergency department